# Patient Record
Sex: FEMALE | Race: WHITE | NOT HISPANIC OR LATINO | Employment: FULL TIME | URBAN - METROPOLITAN AREA
[De-identification: names, ages, dates, MRNs, and addresses within clinical notes are randomized per-mention and may not be internally consistent; named-entity substitution may affect disease eponyms.]

---

## 2017-09-12 PROBLEM — O09.523 ELDERLY MULTIGRAVIDA IN THIRD TRIMESTER: Status: ACTIVE | Noted: 2017-09-12

## 2017-10-15 PROBLEM — R51.9 HEADACHE: Status: ACTIVE | Noted: 2017-10-15

## 2017-11-03 PROBLEM — R51.9 HEADACHE: Status: RESOLVED | Noted: 2017-10-15 | Resolved: 2017-11-03

## 2017-11-03 PROBLEM — O09.523 ELDERLY MULTIGRAVIDA IN THIRD TRIMESTER: Status: RESOLVED | Noted: 2017-09-12 | Resolved: 2017-11-03

## 2020-01-21 LAB
HUMAN PAPILLOMAVIRUS (HPV): NORMAL
PAP SMEAR: NORMAL

## 2020-03-19 ENCOUNTER — TELEPHONE (OUTPATIENT)
Dept: TRANSPLANT | Facility: CLINIC | Age: 45
End: 2020-03-19

## 2020-03-19 NOTE — TELEPHONE ENCOUNTER
Rita Grant called and stated that she is interested in becoming a living donor for her friend, Garo Olivera.  Medical and social history obtained.  No contraindications noted. Patient's blood type is A+, which is not compatible with the recipient (O). Discussed the option of paired kidney donation.  All questions answered.  Kidney donor information book emailed to patient for review. Instructed to contact me with questions or if she is interested in proceeding with paired kidney donation. Patient verbalized understanding.

## 2020-04-21 DIAGNOSIS — Z01.84 ANTIBODY RESPONSE EXAMINATION: ICD-10-CM

## 2020-04-22 ENCOUNTER — LAB VISIT (OUTPATIENT)
Dept: LAB | Facility: HOSPITAL | Age: 45
End: 2020-04-22
Attending: INTERNAL MEDICINE
Payer: COMMERCIAL

## 2020-04-22 DIAGNOSIS — Z01.84 ANTIBODY RESPONSE EXAMINATION: ICD-10-CM

## 2020-04-22 LAB — SARS-COV-2 IGG SERPL QL IA: NEGATIVE

## 2020-04-22 PROCEDURE — 36415 COLL VENOUS BLD VENIPUNCTURE: CPT | Mod: PO

## 2020-04-22 PROCEDURE — 86769 SARS-COV-2 COVID-19 ANTIBODY: CPT

## 2020-05-09 DIAGNOSIS — K04.7 TOOTH ABSCESS: Primary | ICD-10-CM

## 2020-05-09 RX ORDER — PENICILLIN V POTASSIUM 500 MG/1
500 TABLET, FILM COATED ORAL EVERY 8 HOURS
Qty: 20 TABLET | Refills: 0 | Status: SHIPPED | OUTPATIENT
Start: 2020-05-09 | End: 2020-05-16

## 2020-05-25 DIAGNOSIS — B37.9 ANTIBIOTIC-INDUCED YEAST INFECTION: Primary | ICD-10-CM

## 2020-05-25 DIAGNOSIS — T36.95XA ANTIBIOTIC-INDUCED YEAST INFECTION: Primary | ICD-10-CM

## 2020-05-25 RX ORDER — FLUCONAZOLE 150 MG/1
150 TABLET ORAL ONCE
Qty: 2 TABLET | Refills: 0 | Status: SHIPPED | OUTPATIENT
Start: 2020-05-25 | End: 2020-05-25

## 2020-07-23 DIAGNOSIS — B37.9 ANTIBIOTIC-INDUCED YEAST INFECTION: ICD-10-CM

## 2020-07-23 DIAGNOSIS — N30.00 ACUTE CYSTITIS WITHOUT HEMATURIA: Primary | ICD-10-CM

## 2020-07-23 DIAGNOSIS — T36.95XA ANTIBIOTIC-INDUCED YEAST INFECTION: ICD-10-CM

## 2020-07-23 RX ORDER — FLUCONAZOLE 150 MG/1
150 TABLET ORAL ONCE
Qty: 2 TABLET | Refills: 0 | Status: SHIPPED | OUTPATIENT
Start: 2020-07-23 | End: 2020-07-23

## 2020-07-23 RX ORDER — CEPHALEXIN 500 MG/1
500 CAPSULE ORAL EVERY 12 HOURS
Qty: 10 CAPSULE | Refills: 0 | Status: SHIPPED | OUTPATIENT
Start: 2020-07-23 | End: 2020-07-28

## 2020-08-28 ENCOUNTER — OFFICE VISIT (OUTPATIENT)
Dept: FAMILY MEDICINE | Facility: CLINIC | Age: 45
End: 2020-08-28
Payer: COMMERCIAL

## 2020-08-28 VITALS
OXYGEN SATURATION: 97 % | BODY MASS INDEX: 25.57 KG/M2 | DIASTOLIC BLOOD PRESSURE: 70 MMHG | HEIGHT: 64 IN | SYSTOLIC BLOOD PRESSURE: 108 MMHG | WEIGHT: 149.81 LBS | TEMPERATURE: 98 F | HEART RATE: 68 BPM

## 2020-08-28 DIAGNOSIS — Z00.00 WELL ADULT EXAM: Primary | ICD-10-CM

## 2020-08-28 DIAGNOSIS — Z12.31 ENCOUNTER FOR SCREENING MAMMOGRAM FOR MALIGNANT NEOPLASM OF BREAST: ICD-10-CM

## 2020-08-28 DIAGNOSIS — R10.10 UPPER ABDOMINAL PAIN: ICD-10-CM

## 2020-08-28 DIAGNOSIS — Z98.84 HISTORY OF ROUX-EN-Y GASTRIC BYPASS: ICD-10-CM

## 2020-08-28 PROCEDURE — 3008F PR BODY MASS INDEX (BMI) DOCUMENTED: ICD-10-PCS | Mod: CPTII,S$GLB,, | Performed by: INTERNAL MEDICINE

## 2020-08-28 PROCEDURE — 3008F BODY MASS INDEX DOCD: CPT | Mod: CPTII,S$GLB,, | Performed by: INTERNAL MEDICINE

## 2020-08-28 PROCEDURE — 99386 PR PREVENTIVE VISIT,NEW,40-64: ICD-10-PCS | Mod: S$GLB,,, | Performed by: INTERNAL MEDICINE

## 2020-08-28 PROCEDURE — 99386 PREV VISIT NEW AGE 40-64: CPT | Mod: S$GLB,,, | Performed by: INTERNAL MEDICINE

## 2020-08-28 RX ORDER — LEVONORGESTREL AND ETHINYL ESTRADIOL 0.1-0.02MG
KIT ORAL
COMMUNITY
Start: 2020-06-26 | End: 2021-01-04

## 2020-08-28 RX ORDER — VALACYCLOVIR HYDROCHLORIDE 1 G/1
1000 TABLET, FILM COATED ORAL 2 TIMES DAILY
COMMUNITY
Start: 2020-08-21 | End: 2021-01-04 | Stop reason: DRUGHIGH

## 2020-08-28 RX ORDER — MAGNESIUM 250 MG
TABLET ORAL DAILY
COMMUNITY
Start: 2020-07-12

## 2020-08-28 RX ORDER — BIOTIN 10 MG
TABLET ORAL DAILY
COMMUNITY

## 2020-08-28 RX ORDER — MELATONIN 10 MG
CAPSULE ORAL NIGHTLY
COMMUNITY
Start: 2020-08-04

## 2020-08-28 NOTE — PROGRESS NOTES
Subjective:       Patient ID: Rita Grant is a 45 y.o. female.    Medication List with Changes/Refills   Current Medications    BALCOLTRA 0.1 MG-0.02 MG (21)/36.5 MG(7) TAB        BIOTIN 10 MG TAB        ERGOCALCIFEROL, VITAMIN D2, (VITAMIN D ORAL)        LEVONORGESTREL-ETHINYL ESTRADIOL (AVIANE,ALESSE,LESSINA) 0.1-20 MG-MCG PER TABLET        MAGNESIUM 250 MG TAB        MELATONIN 10 MG CAP        PRENATAL VIT,CALC76/IRON/FOLIC (PNV 29-1 ORAL)    Take by mouth.    VALACYCLOVIR (VALTREX) 1000 MG TABLET    Take 1,000 mg by mouth 2 (two) times daily.   Discontinued Medications    HYDROCODONE-ACETAMINOPHEN (NORCO) 7.5-325 MG PER TABLET        PANTOPRAZOLE (PROTONIX) 20 MG TABLET    Take 2 tablets (40 mg total) by mouth once daily.    SUCRALFATE (CARAFATE) 1 GRAM TABLET    Take 1 tablet (1 g total) by mouth 4 (four) times daily.       Chief Complaint: Follow-up  She is a new patient here today to establish care and f/u on recent ER visit.     She has history of bypass surgery (Rodriguez--Y) in 2001. She has not had her labs checked in some time.  She denies any complications since the bypass surgery.      She was seen in ER on 8/24/2020 for severe epigastric pain. Pain started suddenly with bloating and then pain under ribs from mid sternum to her back on the right side.  After 12 hrs of pain it resolved.  Rated 8/10.  Mild increase of symptoms after eating. She had nausea but no vomiting. No fevers. No diarrhea or constipation. No blood in her stool.  She tried TUMs which helped her pain a little.  She had CT and u/s in the ER which was reassuring. Labs showed only mildly elevated LFTs (AST 47, ALT 42) with normal CBC, creatinine, lipase.  She has not had recurrence of pain. She was given pantoprazole and carafate in ER which she has been taking.     She lives with her  and 4 children. She feels safe at home. She exercises regularly. She eats healthy. She is an RN with [employer].      Mammogram----due but she just  "stopped breast feeding and would like to get in a couple months.   Pap-----1/2020 Dr. Duncan  Tdap---yes  Influenza vaccine---annually     Review of Systems   Constitutional: Negative for appetite change, fatigue, fever and unexpected weight change.   HENT: Negative for congestion, ear pain, hearing loss, sore throat and trouble swallowing.    Eyes: Negative for pain and visual disturbance.   Respiratory: Negative for cough, chest tightness, shortness of breath and wheezing.    Cardiovascular: Negative for chest pain, palpitations and leg swelling.   Gastrointestinal: Positive for abdominal pain, constipation and diarrhea. Negative for blood in stool, nausea and vomiting.   Endocrine: Negative for polyuria.   Genitourinary: Negative for dysuria and hematuria.   Musculoskeletal: Negative for arthralgias, back pain and myalgias.   Skin: Negative for rash.   Neurological: Positive for headaches. Negative for dizziness, weakness and numbness.   Hematological: Does not bruise/bleed easily.   Psychiatric/Behavioral: Negative for dysphoric mood, sleep disturbance and suicidal ideas. The patient is not nervous/anxious.        Objective:      Vitals:    08/28/20 1142   BP: 108/70   Pulse: 68   Temp: 98.4 °F (36.9 °C)   TempSrc: Tympanic   SpO2: 97%   Weight: 67.9 kg (149 lb 12.8 oz)   Height: 5' 4" (1.626 m)     Body mass index is 25.71 kg/m².  Physical Exam    General appearance: No acute distress, cooperative  Eyes: PERRL, EOMI, conjunctiva clear  Ears: normal external ear and pinna, tm clear without drainage, canals clear  Nose: Normal mucosa without drainage  Throat: no exudates or erythema, tonsils not enlarged  Mouth: no sores or lesions, moist mucous membranes  Neck: FROM, soft, supple, no thyromegaly, no bruits  Lymph: no anterior or posterior cervical adenopathy  Heart::  Regular rate and rhythm, no murmur  Lung: Clear to ascultation bilaterally, no wheezing, no rales, no rhonchi, no distress  Abdomen: Soft, " nontender, no distention, no hepatosplenomegaly, bowel sounds normal, no guarding, no rebound, no peritoneal signs  Skin: no rashes, no lesions  Extremities: no edema, no cyanosis  Neuro: CN 2-12 intact, 5/5 muscle strength upper and lower extremity bilaterally, 2+ DTRs UE and LE bilaterally, normal gait  Peripheral pulses: 2+ pedal pulses bilaterally, good perfusion and color  Musculoskeletal: FROM, good strenth, no tenderness  Joint: normal appearance, no swelling, no warmth, no deformity in all joints    Assessment:       1. Well adult exam    2. History of Rodriguez-en-Y gastric bypass    3. Upper abdominal pain    4. Encounter for screening mammogram for malignant neoplasm of breast        Plan:       Well adult exam  Normal exam and other than her recent epigastric pain has been doing very well. She is due for labs and mammogram. Will get records for her pap and vaccines which is UTD.   -     CBC auto differential; Future; Expected date: 08/28/2020  -     Comprehensive metabolic panel; Future; Expected date: 08/28/2020  -     Lipid Panel; Future; Expected date: 08/28/2020  -     Vitamin B12; Future; Expected date: 08/28/2020  -     Vitamin D; Future; Expected date: 08/28/2020  -     Iron and TIBC; Future; Expected date: 08/28/2020  -     Hepatitis C Antibody; Future; Expected date: 08/28/2020  -     TSH; Future; Expected date: 08/28/2020  -     Ferritin; Future; Expected date: 08/28/2020    History of Rodriguez-en-Y gastric bypass  She is not on vitamin supplementation. Will check vitamin D, iron studies and vitamin B 12 levels.  Will replace if needed.   -     Vitamin B12; Future; Expected date: 08/28/2020  -     Vitamin D; Future; Expected date: 08/28/2020  -     Iron and TIBC; Future; Expected date: 08/28/2020  -     Ferritin; Future; Expected date: 08/28/2020    Upper abdominal pain  Resolved with reassuring imaging (no gallstones seen).  Okay to stop pantoprazole and carafate. She will let me know if symptoms recur  and then consider EGD.  I suspect due to heartburn or gas but she did have a mild increase in her LFTs.  Will recheck LFTs to make sure they have normalized.      Encounter for screening mammogram for malignant neoplasm of breast  -     Mammo Digital Screening Bilat; Future; Expected date: 08/28/2020    Follow up in about 1 year (around 8/28/2021) for annual exam.

## 2020-08-29 PROBLEM — Z98.84 HISTORY OF ROUX-EN-Y GASTRIC BYPASS: Status: ACTIVE | Noted: 2020-08-29

## 2020-08-30 ENCOUNTER — OFFICE VISIT (OUTPATIENT)
Dept: URGENT CARE | Facility: CLINIC | Age: 45
End: 2020-08-30
Payer: COMMERCIAL

## 2020-08-30 VITALS
OXYGEN SATURATION: 98 % | RESPIRATION RATE: 16 BRPM | HEIGHT: 64 IN | DIASTOLIC BLOOD PRESSURE: 82 MMHG | BODY MASS INDEX: 25.44 KG/M2 | HEART RATE: 69 BPM | TEMPERATURE: 98 F | WEIGHT: 149 LBS | SYSTOLIC BLOOD PRESSURE: 117 MMHG

## 2020-08-30 DIAGNOSIS — H61.23 EXCESSIVE CERUMEN IN BOTH EAR CANALS: Primary | ICD-10-CM

## 2020-08-30 PROCEDURE — 99214 OFFICE O/P EST MOD 30 MIN: CPT | Mod: 25,S$GLB,, | Performed by: PHYSICIAN ASSISTANT

## 2020-08-30 PROCEDURE — 99214 PR OFFICE/OUTPT VISIT, EST, LEVL IV, 30-39 MIN: ICD-10-PCS | Mod: 25,S$GLB,, | Performed by: PHYSICIAN ASSISTANT

## 2020-08-30 PROCEDURE — 69210 REMOVE IMPACTED EAR WAX UNI: CPT | Mod: S$GLB,,, | Performed by: PHYSICIAN ASSISTANT

## 2020-08-30 PROCEDURE — 69210 EAR CERUMEN REMOVAL: ICD-10-PCS | Mod: S$GLB,,, | Performed by: PHYSICIAN ASSISTANT

## 2020-08-30 RX ORDER — NEOMYCIN SULFATE, POLYMYXIN B SULFATE, HYDROCORTISONE 3.5; 10000; 1 MG/ML; [USP'U]/ML; MG/ML
3 SOLUTION/ DROPS AURICULAR (OTIC) 3 TIMES DAILY
Qty: 10 ML | Refills: 0 | Status: SHIPPED | OUTPATIENT
Start: 2020-08-30 | End: 2020-09-09

## 2020-08-30 NOTE — PATIENT INSTRUCTIONS
· Follow up with your primary care if symptoms do not improve, or you may return here at any time.  · If you were referred to a specialist, please follow up with that specialty.  · If you were prescribed antibiotics, please take them to completion.  · If you were prescribed a narcotic or any medication with sedative effects, do not drive or operate heavy equipment or machinery while taking these medications.  · You must understand that you have received treatment at an Urgent Care facility only, and that you may be released before all of your medical problems are known or treated. Urgent Care facilities are not equipped to handle life threatening emergencies. It is recommended that you seek care at an Emergency Department for further evaluation of worsening or concerning symptoms, or possibly life threatening conditions as discussed.                                        If you  smoke, please stop smoking               PLEASE PRACTICE SOCIAL DISTANCING          Earwax Removal    The ear canal makes earwax from the canals lining. The ears make wax to lubricate and protect the ear canal. The ear canal is the tube that connects the middle ear to the outside of the ear. The wax protects the ear from bacteria, infection, and damage from water or trauma.  The wax that forms in the canal naturally moves toward the outside of the ear and falls out. In some cases, the ear may make too much wax. If the wax causes problems or keeps the healthcare provider from seeing into the ear, the extra wax may be removed.  Too much wax can affect your hearing. It can cause itching. In rare cases, it can be painful. Earwax should not be removed unless it is causing a problem. You should not stick objects into your ear to remove wax unless told to do so by your healthcare provider.  Healthcare providers can remove earwax safely. It is important to stay still during the procedure to avoid damage to the ear canal. But removing earwax  generally doesnt hurt. You will not usually need anesthesia or pain medicine when the provider removes the earwax.  A number of conditions lead to earwax buildup. These include some skin problems, a narrow ear canal, or ears that make too much earwax. Using cotton swabs in the canal pushes earwax deeper into the ear and contributes to the buildup of earwax.  Home care  · The healthcare provider may recommend mineral oil or an over-the-counter eardrop to use at home to soften the earwax. Use these products only if the provider recommends them. Use these products only if the provider recommends them. Carefully follow the instructions given.  · Dont use mineral oil or OTC eardrops if you might have an ear infection or a ruptured eardrum. Tell your healthcare provider right away if you have diabetes or an immune disorder.  · Dont use cotton swabs in your ears. Cotton swabs may push wax deeper into the ear canal or damage the eardrum. Use cotton gauze or a wet washcloth  to gently remove wax on the outside of the ear and around the opening to the ear canal.  · Don't use any probing device or object such as cotton-tipped swabs or anuja pins to clean the inside of your ears.  · Dont use ear candles to clean your ears. Candling can be dangerous. It can burn the ear canal. It can also make the condition worse instead of better.  · Dont use cold water to rinse the ear. This will make you dizzy. If your provider tells you to rinse your ear, use only warm water or follow his or her instructions.  · Check the ear for signs of infection or irritation listed below under When to seek medical advice.  Steps for using eardrops  1. Warm the medicine bottle by rubbing it between your hands for a few minutes.  2. Lie down on your side, with the affected ear up.  3. Place the recommended number of drops in the ear. Wet a cotton ball with the medicine. Gently put the cotton ball into the ear opening.  Follow-up care  Follow up with  your healthcare provider, or as directed.  When to seek medical advice  Call the provider right away if you have:  · Ear pain that gets worse  · Fever of 100.4F°F (38°C) or higher, or as directed by your healthcare provider  · Worsening wax buildup  · Severe pain, dizziness, or nausea  · Bleeding from the ear  · Hearing problems  · Signs of irritation from the eardrops, such as burning, stinging, or swelling and tenderness  · Foul-smelling fluid draining from the ear  · Swelling, redness, or tenderness of the outer ear  · Headache, neck pain, or stiff neck  Date Last Reviewed: 3/22/2015  © 1801-4211 [employer]. [address] [place] [address], [place] [address]. All rights reserved. This information is not intended as a substitute for professional medical care. Always follow your healthcare professional's instructions.

## 2020-08-30 NOTE — PROCEDURES
Ear Cerumen Removal    Date/Time: 8/30/2020 9:25 AM  Performed by: Angel Greenberg PA-C  Authorized by: Angel Greenberg PA-C     Consent Done?:  Yes (Verbal)    Local anesthetic:  None  Ceruminolytics applied: Ceruminolytics applied prior to the procedure    Location details:  Both ears  Procedure type comment:  Irrigation and curette  Cerumen  Removal Results:  Cerumen completely removed  Patient tolerance:  Patient tolerated the procedure well with no immediate complications

## 2020-08-30 NOTE — PROGRESS NOTES
"Subjective:       Patient ID: Rita Grant is a 45 y.o. female.    Vitals:  height is 5' 4" (1.626 m) and weight is 67.6 kg (149 lb). Her oral temperature is 98 °F (36.7 °C). Her blood pressure is 117/82 and her pulse is 69. Her respiration is 16 and oxygen saturation is 98%.     Chief Complaint: Cerumen Impaction    Pt presents today with wax build up in her ears. Pt was at [facility] Friday and was told she had large amount of wax build up in her ears to use OTC remover. Pt did attempt but no relief.     Ear Fullness   There is pain in both ears. This is a new problem. The current episode started in the past 7 days. The problem occurs constantly. The problem has been unchanged. There has been no fever. The fever has been present for less than 1 day. The patient is experiencing no pain. Pertinent negatives include no abdominal pain, coughing, diarrhea, ear discharge, headaches, hearing loss, neck pain, rash, rhinorrhea, sore throat or vomiting. Treatments tried: OTC wax remover. The treatment provided no relief. There is no history of a chronic ear infection, hearing loss or a tympanostomy tube.       Constitution: Negative for chills, fatigue and fever.   HENT: Negative for ear discharge, hearing loss, congestion and sore throat.    Neck: Negative for neck pain and painful lymph nodes.   Cardiovascular: Negative for chest pain and leg swelling.   Eyes: Negative for double vision and blurred vision.   Respiratory: Negative for cough and shortness of breath.    Gastrointestinal: Negative for abdominal pain, nausea, vomiting and diarrhea.   Genitourinary: Negative for dysuria, frequency, urgency and history of kidney stones.   Musculoskeletal: Negative for joint pain, joint swelling, muscle cramps and muscle ache.   Skin: Negative for color change, pale, rash and bruising.   Allergic/Immunologic: Negative for seasonal allergies.   Neurological: Negative for dizziness, history of vertigo, light-headedness, passing out and " headaches.   Hematologic/Lymphatic: Negative for swollen lymph nodes.   Psychiatric/Behavioral: Negative for nervous/anxious, sleep disturbance and depression. The patient is not nervous/anxious.        Objective:      Physical Exam   Constitutional: She is cooperative.  Non-toxic appearance. She does not appear ill. No distress.   HENT:   Ears:   Right Ear: Hearing and tympanic membrane normal.   Left Ear: Hearing and tympanic membrane normal.   Ears:    Pulmonary/Chest: Effort normal. No respiratory distress.   Abdominal: Normal appearance.   Neurological: She is alert.   Skin: Skin is not diaphoretic.         Assessment:       1. Excessive cerumen in both ear canals        Plan:       All hx was provided by the pt or available as part of established EMR. The pt past medical hx, family hx, social hx, and current medications were reviewed. Tx options discussed. Pt to follow up with [facility] if no improvement or for any concern, and seek treatment in an ER for worsening. Pt voiced understanding of all discussed, and agreed with decision making.    Excessive cerumen in both ear canals  -     Ear Cerumen Removal    Other orders  -     neomycin-polymyxin-hydrocortisone (CORTISPORIN) otic solution; Place 3 drops into both ears 3 (three) times daily. for 10 days  Dispense: 10 mL; Refill: 0      Patient Instructions   · Follow up with your primary care if symptoms do not improve, or you may return here at any time.  · If you were referred to a specialist, please follow up with that specialty.  · If you were prescribed antibiotics, please take them to completion.  · If you were prescribed a narcotic or any medication with sedative effects, do not drive or operate heavy equipment or machinery while taking these medications.  · You must understand that you have received treatment at an Urgent Care facility only, and that you may be released before all of your medical problems are known or treated. Urgent Care facilities are not  equipped to handle life threatening emergencies. It is recommended that you seek care at an Emergency Department for further evaluation of worsening or concerning symptoms, or possibly life threatening conditions as discussed.                                        If you  smoke, please stop smoking               PLEASE PRACTICE SOCIAL DISTANCING          Earwax Removal    The ear canal makes earwax from the canals lining. The ears make wax to lubricate and protect the ear canal. The ear canal is the tube that connects the middle ear to the outside of the ear. The wax protects the ear from bacteria, infection, and damage from water or trauma.  The wax that forms in the canal naturally moves toward the outside of the ear and falls out. In some cases, the ear may make too much wax. If the wax causes problems or keeps the healthcare provider from seeing into the ear, the extra wax may be removed.  Too much wax can affect your hearing. It can cause itching. In rare cases, it can be painful. Earwax should not be removed unless it is causing a problem. You should not stick objects into your ear to remove wax unless told to do so by your healthcare provider.  Healthcare providers can remove earwax safely. It is important to stay still during the procedure to avoid damage to the ear canal. But removing earwax generally doesnt hurt. You will not usually need anesthesia or pain medicine when the provider removes the earwax.  A number of conditions lead to earwax buildup. These include some skin problems, a narrow ear canal, or ears that make too much earwax. Using cotton swabs in the canal pushes earwax deeper into the ear and contributes to the buildup of earwax.  Home care  · The healthcare provider may recommend mineral oil or an over-the-counter eardrop to use at home to soften the earwax. Use these products only if the provider recommends them. Use these products only if the provider recommends them. Carefully follow  the instructions given.  · Dont use mineral oil or OTC eardrops if you might have an ear infection or a ruptured eardrum. Tell your healthcare provider right away if you have diabetes or an immune disorder.  · Dont use cotton swabs in your ears. Cotton swabs may push wax deeper into the ear canal or damage the eardrum. Use cotton gauze or a wet washcloth  to gently remove wax on the outside of the ear and around the opening to the ear canal.  · Don't use any probing device or object such as cotton-tipped swabs or anuja pins to clean the inside of your ears.  · Dont use ear candles to clean your ears. Candling can be dangerous. It can burn the ear canal. It can also make the condition worse instead of better.  · Dont use cold water to rinse the ear. This will make you dizzy. If your provider tells you to rinse your ear, use only warm water or follow his or her instructions.  · Check the ear for signs of infection or irritation listed below under When to seek medical advice.  Steps for using eardrops  1. Warm the medicine bottle by rubbing it between your hands for a few minutes.  2. Lie down on your side, with the affected ear up.  3. Place the recommended number of drops in the ear. Wet a cotton ball with the medicine. Gently put the cotton ball into the ear opening.  Follow-up care  Follow up with your healthcare provider, or as directed.  When to seek medical advice  Call the provider right away if you have:  · Ear pain that gets worse  · Fever of 100.4F°F (38°C) or higher, or as directed by your healthcare provider  · Worsening wax buildup  · Severe pain, dizziness, or nausea  · Bleeding from the ear  · Hearing problems  · Signs of irritation from the eardrops, such as burning, stinging, or swelling and tenderness  · Foul-smelling fluid draining from the ear  · Swelling, redness, or tenderness of the outer ear  · Headache, neck pain, or stiff neck  Date Last Reviewed: 3/22/2015  © 4343-1784 [employer]. [address] [place] [address], [place] [address]. All rights reserved. This information is not intended as a substitute for professional medical care. Always follow your healthcare professional's instructions.

## 2020-09-19 ENCOUNTER — LAB VISIT (OUTPATIENT)
Dept: LAB | Facility: HOSPITAL | Age: 45
End: 2020-09-19
Attending: INTERNAL MEDICINE
Payer: COMMERCIAL

## 2020-09-19 DIAGNOSIS — Z98.84 HISTORY OF ROUX-EN-Y GASTRIC BYPASS: ICD-10-CM

## 2020-09-19 DIAGNOSIS — Z00.00 WELL ADULT EXAM: ICD-10-CM

## 2020-09-19 LAB
25(OH)D3+25(OH)D2 SERPL-MCNC: 39 NG/ML (ref 30–96)
ALBUMIN SERPL BCP-MCNC: 4.3 G/DL (ref 3.5–5.2)
ALP SERPL-CCNC: 62 U/L (ref 55–135)
ALT SERPL W/O P-5'-P-CCNC: 16 U/L (ref 10–44)
ANION GAP SERPL CALC-SCNC: 12 MMOL/L (ref 8–16)
AST SERPL-CCNC: 26 U/L (ref 10–40)
BASOPHILS # BLD AUTO: 0.04 K/UL (ref 0–0.2)
BASOPHILS NFR BLD: 0.7 % (ref 0–1.9)
BILIRUB SERPL-MCNC: 0.4 MG/DL (ref 0.1–1)
BUN SERPL-MCNC: 13 MG/DL (ref 6–20)
CALCIUM SERPL-MCNC: 9 MG/DL (ref 8.7–10.5)
CHLORIDE SERPL-SCNC: 107 MMOL/L (ref 95–110)
CHOLEST SERPL-MCNC: 205 MG/DL (ref 120–199)
CHOLEST/HDLC SERPL: 2.5 {RATIO} (ref 2–5)
CO2 SERPL-SCNC: 20 MMOL/L (ref 23–29)
CREAT SERPL-MCNC: 0.9 MG/DL (ref 0.5–1.4)
DIFFERENTIAL METHOD: ABNORMAL
EOSINOPHIL # BLD AUTO: 0.1 K/UL (ref 0–0.5)
EOSINOPHIL NFR BLD: 1.7 % (ref 0–8)
ERYTHROCYTE [DISTWIDTH] IN BLOOD BY AUTOMATED COUNT: 13.6 % (ref 11.5–14.5)
EST. GFR  (AFRICAN AMERICAN): >60 ML/MIN/1.73 M^2
EST. GFR  (NON AFRICAN AMERICAN): >60 ML/MIN/1.73 M^2
FERRITIN SERPL-MCNC: 34 NG/ML (ref 20–300)
GLUCOSE SERPL-MCNC: 87 MG/DL (ref 70–110)
HCT VFR BLD AUTO: 42.4 % (ref 37–48.5)
HDLC SERPL-MCNC: 82 MG/DL (ref 40–75)
HDLC SERPL: 40 % (ref 20–50)
HGB BLD-MCNC: 13.5 G/DL (ref 12–16)
IMM GRANULOCYTES # BLD AUTO: 0 K/UL (ref 0–0.04)
IMM GRANULOCYTES NFR BLD AUTO: 0 % (ref 0–0.5)
IRON SERPL-MCNC: 156 UG/DL (ref 30–160)
LDLC SERPL CALC-MCNC: 101.2 MG/DL (ref 63–159)
LYMPHOCYTES # BLD AUTO: 2.5 K/UL (ref 1–4.8)
LYMPHOCYTES NFR BLD: 42.7 % (ref 18–48)
MCH RBC QN AUTO: 31.5 PG (ref 27–31)
MCHC RBC AUTO-ENTMCNC: 31.8 G/DL (ref 32–36)
MCV RBC AUTO: 99 FL (ref 82–98)
MONOCYTES # BLD AUTO: 0.4 K/UL (ref 0.3–1)
MONOCYTES NFR BLD: 6.2 % (ref 4–15)
NEUTROPHILS # BLD AUTO: 2.9 K/UL (ref 1.8–7.7)
NEUTROPHILS NFR BLD: 48.7 % (ref 38–73)
NONHDLC SERPL-MCNC: 123 MG/DL
NRBC BLD-RTO: 0 /100 WBC
PLATELET # BLD AUTO: 154 K/UL (ref 150–350)
PMV BLD AUTO: 11 FL (ref 9.2–12.9)
POTASSIUM SERPL-SCNC: 4.5 MMOL/L (ref 3.5–5.1)
PROT SERPL-MCNC: 7.8 G/DL (ref 6–8.4)
RBC # BLD AUTO: 4.29 M/UL (ref 4–5.4)
SATURATED IRON: 40 % (ref 20–50)
SODIUM SERPL-SCNC: 139 MMOL/L (ref 136–145)
T4 FREE SERPL-MCNC: 1.05 NG/DL (ref 0.71–1.51)
TOTAL IRON BINDING CAPACITY: 389 UG/DL (ref 250–450)
TRANSFERRIN SERPL-MCNC: 263 MG/DL (ref 200–375)
TRIGL SERPL-MCNC: 109 MG/DL (ref 30–150)
TSH SERPL DL<=0.005 MIU/L-ACNC: 0.38 UIU/ML (ref 0.4–4)
VIT B12 SERPL-MCNC: 299 PG/ML (ref 210–950)
WBC # BLD AUTO: 5.93 K/UL (ref 3.9–12.7)

## 2020-09-19 PROCEDURE — 82728 ASSAY OF FERRITIN: CPT

## 2020-09-19 PROCEDURE — 83540 ASSAY OF IRON: CPT

## 2020-09-19 PROCEDURE — 82306 VITAMIN D 25 HYDROXY: CPT

## 2020-09-19 PROCEDURE — 85025 COMPLETE CBC W/AUTO DIFF WBC: CPT

## 2020-09-19 PROCEDURE — 86803 HEPATITIS C AB TEST: CPT

## 2020-09-19 PROCEDURE — 80053 COMPREHEN METABOLIC PANEL: CPT

## 2020-09-19 PROCEDURE — 84443 ASSAY THYROID STIM HORMONE: CPT

## 2020-09-19 PROCEDURE — 84439 ASSAY OF FREE THYROXINE: CPT

## 2020-09-19 PROCEDURE — 36415 COLL VENOUS BLD VENIPUNCTURE: CPT | Mod: PO

## 2020-09-19 PROCEDURE — 82607 VITAMIN B-12: CPT

## 2020-09-19 PROCEDURE — 80061 LIPID PANEL: CPT

## 2020-09-21 LAB — HCV AB SERPL QL IA: NEGATIVE

## 2020-09-22 ENCOUNTER — PATIENT MESSAGE (OUTPATIENT)
Dept: FAMILY MEDICINE | Facility: CLINIC | Age: 45
End: 2020-09-22

## 2020-09-22 DIAGNOSIS — R94.6 ABNORMAL RESULTS OF THYROID FUNCTION STUDIES: Primary | ICD-10-CM

## 2020-09-23 ENCOUNTER — PATIENT MESSAGE (OUTPATIENT)
Dept: FAMILY MEDICINE | Facility: CLINIC | Age: 45
End: 2020-09-23

## 2020-10-05 ENCOUNTER — PATIENT MESSAGE (OUTPATIENT)
Dept: ADMINISTRATIVE | Facility: HOSPITAL | Age: 45
End: 2020-10-05

## 2020-10-12 ENCOUNTER — PATIENT MESSAGE (OUTPATIENT)
Dept: ADMINISTRATIVE | Facility: HOSPITAL | Age: 45
End: 2020-10-12

## 2020-10-12 ENCOUNTER — PATIENT OUTREACH (OUTPATIENT)
Dept: ADMINISTRATIVE | Facility: HOSPITAL | Age: 45
End: 2020-10-12

## 2020-10-12 ENCOUNTER — PATIENT MESSAGE (OUTPATIENT)
Dept: FAMILY MEDICINE | Facility: CLINIC | Age: 45
End: 2020-10-12

## 2020-10-12 DIAGNOSIS — Z82.49 FAMILY HISTORY OF EARLY CAD: Primary | ICD-10-CM

## 2020-10-12 NOTE — PROGRESS NOTES
Non-compliant [facility] report chart review - Chart review completed for the following  test if overdue  (Mammogram, Colonoscopy, Cervical Cancer Screening,  Diabetic lab testing, and/or Dilated EYE EXAM)  10/12/2020    Care Everywhere and Media reports - updates requested and reviewed.        [facility] and [facility] reviewed.  Pap Smear and/or  LABS       [facility] reviewed for test needed.  Mammogram        Updated HM with Pap smear with HPV     report.             Health Maintenance Due   Topic Date Due    Mammogram  04/20/2016

## 2020-10-14 ENCOUNTER — PATIENT MESSAGE (OUTPATIENT)
Dept: FAMILY MEDICINE | Facility: CLINIC | Age: 45
End: 2020-10-14

## 2020-10-14 ENCOUNTER — HOSPITAL ENCOUNTER (OUTPATIENT)
Dept: RADIOLOGY | Facility: HOSPITAL | Age: 45
Discharge: HOME | End: 2020-10-14
Attending: INTERNAL MEDICINE
Payer: COMMERCIAL

## 2020-10-14 DIAGNOSIS — M25.572 ACUTE LEFT ANKLE PAIN: ICD-10-CM

## 2020-10-14 DIAGNOSIS — M25.572 ACUTE LEFT ANKLE PAIN: Primary | ICD-10-CM

## 2020-10-14 PROCEDURE — 73610 X-RAY EXAM OF ANKLE: CPT | Mod: 26,LT,, | Performed by: RADIOLOGY

## 2020-10-14 PROCEDURE — 73610 XR ANKLE COMPLETE 3 VIEW LEFT: ICD-10-PCS | Mod: 26,LT,, | Performed by: RADIOLOGY

## 2020-10-14 PROCEDURE — 73610 X-RAY EXAM OF ANKLE: CPT | Mod: TC,FY,PO,LT

## 2020-10-14 NOTE — TELEPHONE ENCOUNTER
It looks like you have a glitch in the schedule today. 2 back to back indirect patient care spots.Can we put her in @ 11?

## 2020-10-15 ENCOUNTER — TELEPHONE (OUTPATIENT)
Dept: FAMILY MEDICINE | Facility: CLINIC | Age: 45
End: 2020-10-15

## 2020-10-15 DIAGNOSIS — M25.572 ACUTE LEFT ANKLE PAIN: Primary | ICD-10-CM

## 2020-10-15 NOTE — TELEPHONE ENCOUNTER
I called last night about her xray.     Please call her to schedule appt with orthopedics.     Thanks

## 2020-10-16 ENCOUNTER — OFFICE VISIT (OUTPATIENT)
Dept: ORTHOPEDICS | Facility: CLINIC | Age: 45
End: 2020-10-16
Payer: COMMERCIAL

## 2020-10-16 ENCOUNTER — LAB VISIT (OUTPATIENT)
Dept: LAB | Facility: HOSPITAL | Age: 45
End: 2020-10-16
Attending: INTERNAL MEDICINE
Payer: COMMERCIAL

## 2020-10-16 VITALS — BODY MASS INDEX: 25.45 KG/M2 | WEIGHT: 149.06 LBS | HEIGHT: 64 IN

## 2020-10-16 DIAGNOSIS — R94.6 ABNORMAL RESULTS OF THYROID FUNCTION STUDIES: ICD-10-CM

## 2020-10-16 DIAGNOSIS — M25.572 ACUTE LEFT ANKLE PAIN: Primary | ICD-10-CM

## 2020-10-16 DIAGNOSIS — S93.402A SPRAIN OF LEFT ANKLE, UNSPECIFIED LIGAMENT, INITIAL ENCOUNTER: ICD-10-CM

## 2020-10-16 LAB — TSH SERPL DL<=0.005 MIU/L-ACNC: 2.68 UIU/ML (ref 0.4–4)

## 2020-10-16 PROCEDURE — 99203 OFFICE O/P NEW LOW 30 MIN: CPT | Mod: 25,S$GLB,, | Performed by: ORTHOPAEDIC SURGERY

## 2020-10-16 PROCEDURE — 3008F PR BODY MASS INDEX (BMI) DOCUMENTED: ICD-10-PCS | Mod: CPTII,S$GLB,, | Performed by: ORTHOPAEDIC SURGERY

## 2020-10-16 PROCEDURE — 97760 PR ORTHOTIC MGMT&TRAINJ INITIAL ENC EA 15 MINS: ICD-10-PCS | Mod: S$GLB,,, | Performed by: ORTHOPAEDIC SURGERY

## 2020-10-16 PROCEDURE — 3008F BODY MASS INDEX DOCD: CPT | Mod: CPTII,S$GLB,, | Performed by: ORTHOPAEDIC SURGERY

## 2020-10-16 PROCEDURE — 36415 COLL VENOUS BLD VENIPUNCTURE: CPT | Mod: PO

## 2020-10-16 PROCEDURE — 99999 PR PBB SHADOW E&M-EST. PATIENT-LVL III: CPT | Mod: PBBFAC,,, | Performed by: ORTHOPAEDIC SURGERY

## 2020-10-16 PROCEDURE — 99999 PR PBB SHADOW E&M-EST. PATIENT-LVL III: ICD-10-PCS | Mod: PBBFAC,,, | Performed by: ORTHOPAEDIC SURGERY

## 2020-10-16 PROCEDURE — 99203 PR OFFICE/OUTPT VISIT, NEW, LEVL III, 30-44 MIN: ICD-10-PCS | Mod: 25,S$GLB,, | Performed by: ORTHOPAEDIC SURGERY

## 2020-10-16 PROCEDURE — 84443 ASSAY THYROID STIM HORMONE: CPT

## 2020-10-16 PROCEDURE — 97760 ORTHOTIC MGMT&TRAING 1ST ENC: CPT | Mod: S$GLB,,, | Performed by: ORTHOPAEDIC SURGERY

## 2020-10-16 NOTE — PROGRESS NOTES
45 years old complaining of pain in the left ankle for about 3 weeks time.  Nose after running for exercise.  Pain is doing days, 6 on bad days.  Ice and ibuprofen seem to help worse walking seems to make it worse.  Points to the anterior lateral aspect of the ankle as location pain    Exam shows he is tender in the region of the ATFL, I cannot detect any instability, flexors and extensors are intact, nontender along the peroneal tendons or the posterior tibial tendon, negative Tinel's at the tarsal tunnel    X-rays are negative    Assessment:  Left ankle sprain    Plan:  Lace-up ankle brace, ankle strengthening exercise, follow-up in a few weeks time to ensure complete resolution of her symptoms    We performed a custom orthotic/brace fitting, adjusting and training with the patient. The patient demonstrated understanding and proper care. This was performed for 15 minutes.    Further History  Aching pain  Worse with activity  Relieved with rest  No other associated symptoms  No other radiation    Further Exam  Alert and oriented  Pleasant  Contralateral limb has appropriate range of motion for age and condition  Contralateral limb has appropriate strength for age and condition  Contralateral limb has appropriate stability  for age and condition  No adenopathy  Pulses are appropriate for current condition  Skin is intact        Chief Complaint    Chief Complaint   Patient presents with    Left Ankle - Pain       HPI  Rita Grant is a 45 y.o.  female who presents with       Past Medical History  Past Medical History:   Diagnosis Date    Anemia        Past Surgical History  Past Surgical History:   Procedure Laterality Date     SECTION      x 3    GASTRIC BYPASS      Rodriguez n Y       Medications  Current Outpatient Medications   Medication Sig    biotin 10 mg Tab     ergocalciferol, vitamin D2, (VITAMIN D ORAL)     levonorgestrel-ethinyl estradiol (AVIANE,ALESSE,LESSINA) 0.1-20 mg-mcg per tablet      magnesium 250 mg Tab     melatonin 10 mg Cap     PRENATAL VIT,CALC76/IRON/FOLIC (PNV 29-1 ORAL) Take by mouth.    valACYclovir (VALTREX) 1000 MG tablet Take 1,000 mg by mouth 2 (two) times daily.    BALCOLTRA 0.1 mg-0.02 mg (21)/36.5 mg(7) Tab      No current facility-administered medications for this visit.        Allergies  Review of patient's allergies indicates:   Allergen Reactions    Bactrim [sulfamethoxazole-trimethoprim] Swelling       Family History  Family History   Problem Relation Age of Onset    Lung cancer Paternal Grandfather         lung    Colon cancer Paternal Grandmother         colon    Heart disease Maternal Grandfather     Hypertension Father     Heart disease Father     Thyroid nodules Mother     Hypothyroidism Mother     Ovarian cancer Maternal Grandmother        Social History  Social History     Socioeconomic History    Marital status:      Spouse name: Not on file    Number of children: Not on file    Years of education: Not on file    Highest education level: Not on file   Occupational History    Occupation: nurse    Social Needs    Financial resource strain: Not on file    Food insecurity     Worry: Not on file     Inability: Not on file    Transportation needs     Medical: Not on file     Non-medical: Not on file   Tobacco Use    Smoking status: Never Smoker    Smokeless tobacco: Never Used   Substance and Sexual Activity    Alcohol use: Yes     Frequency: Monthly or less     Comment: 2x year    Drug use: No    Sexual activity: Yes     Partners: Male   Lifestyle    Physical activity     Days per week: Not on file     Minutes per session: Not on file    Stress: Not on file   Relationships    Social connections     Talks on phone: Not on file     Gets together: Not on file     Attends Jehovah's witness service: Not on file     Active member of club or organization: Not on file     Attends meetings of clubs or organizations: Not on file     Relationship  status: Not on file   Other Topics Concern    Not on file   Social History Narrative    Not on file               Review of Systems     Constitutional: Negative    HENT: Negative  Eyes: Negative  Respiratory: Negative  Cardiovascular: Negative  Musculoskeletal: HPI  Skin: Negative  Neurological: Negative  Hematological: Negative  Endocrine: Negative                 Physical Exam    There were no vitals filed for this visit.  Body mass index is 25.58 kg/m².  Physical Examination:     General appearance -  well appearing, and in no distress  Mental status - awake  Neck - supple  Chest -  symmetric air entry  Heart - normal rate   Abdomen - soft      Assessment     1. Acute left ankle pain          Plan

## 2020-10-17 ENCOUNTER — PATIENT MESSAGE (OUTPATIENT)
Dept: FAMILY MEDICINE | Facility: CLINIC | Age: 45
End: 2020-10-17

## 2020-11-04 ENCOUNTER — PATIENT MESSAGE (OUTPATIENT)
Dept: FAMILY MEDICINE | Facility: CLINIC | Age: 45
End: 2020-11-04

## 2020-11-14 ENCOUNTER — HOSPITAL ENCOUNTER (OUTPATIENT)
Dept: RADIOLOGY | Facility: HOSPITAL | Age: 45
Discharge: HOME | End: 2020-11-14
Attending: INTERNAL MEDICINE
Payer: COMMERCIAL

## 2020-11-14 DIAGNOSIS — Z12.31 SCREENING MAMMOGRAM, ENCOUNTER FOR: ICD-10-CM

## 2020-11-14 PROCEDURE — 77067 SCR MAMMO BI INCL CAD: CPT | Mod: 26,,, | Performed by: RADIOLOGY

## 2020-11-14 PROCEDURE — 77063 MAMMO DIGITAL SCREENING BILAT WITH TOMO: ICD-10-PCS | Mod: 26,,, | Performed by: RADIOLOGY

## 2020-11-14 PROCEDURE — 77067 SCR MAMMO BI INCL CAD: CPT | Mod: TC,PO

## 2020-11-14 PROCEDURE — 77067 MAMMO DIGITAL SCREENING BILAT WITH TOMO: ICD-10-PCS | Mod: 26,,, | Performed by: RADIOLOGY

## 2020-11-14 PROCEDURE — 77063 BREAST TOMOSYNTHESIS BI: CPT | Mod: 26,,, | Performed by: RADIOLOGY

## 2020-11-22 DIAGNOSIS — B37.9 ANTIBIOTIC-INDUCED YEAST INFECTION: Primary | ICD-10-CM

## 2020-11-22 DIAGNOSIS — T36.95XA ANTIBIOTIC-INDUCED YEAST INFECTION: Primary | ICD-10-CM

## 2020-11-22 RX ORDER — FLUCONAZOLE 150 MG/1
150 TABLET ORAL ONCE
Qty: 2 TABLET | Refills: 0 | Status: SHIPPED | OUTPATIENT
Start: 2020-11-22 | End: 2020-11-22

## 2021-01-04 ENCOUNTER — OFFICE VISIT (OUTPATIENT)
Dept: FAMILY MEDICINE | Facility: CLINIC | Age: 46
End: 2021-01-04
Payer: COMMERCIAL

## 2021-01-04 VITALS
BODY MASS INDEX: 27.1 KG/M2 | HEIGHT: 64 IN | RESPIRATION RATE: 16 BRPM | OXYGEN SATURATION: 99 % | WEIGHT: 158.75 LBS | DIASTOLIC BLOOD PRESSURE: 82 MMHG | TEMPERATURE: 98 F | SYSTOLIC BLOOD PRESSURE: 112 MMHG | HEART RATE: 77 BPM

## 2021-01-04 DIAGNOSIS — R10.13 DYSPEPSIA: ICD-10-CM

## 2021-01-04 DIAGNOSIS — R07.81 ANTERIOR PLEURITIC PAIN: Primary | ICD-10-CM

## 2021-01-04 PROCEDURE — 3008F BODY MASS INDEX DOCD: CPT | Mod: CPTII,S$GLB,, | Performed by: INTERNAL MEDICINE

## 2021-01-04 PROCEDURE — 1125F PR PAIN SEVERITY QUANTIFIED, PAIN PRESENT: ICD-10-PCS | Mod: S$GLB,,, | Performed by: INTERNAL MEDICINE

## 2021-01-04 PROCEDURE — 99213 PR OFFICE/OUTPT VISIT, EST, LEVL III, 20-29 MIN: ICD-10-PCS | Mod: S$GLB,,, | Performed by: INTERNAL MEDICINE

## 2021-01-04 PROCEDURE — 3008F PR BODY MASS INDEX (BMI) DOCUMENTED: ICD-10-PCS | Mod: CPTII,S$GLB,, | Performed by: INTERNAL MEDICINE

## 2021-01-04 PROCEDURE — 1125F AMNT PAIN NOTED PAIN PRSNT: CPT | Mod: S$GLB,,, | Performed by: INTERNAL MEDICINE

## 2021-01-04 PROCEDURE — 99213 OFFICE O/P EST LOW 20 MIN: CPT | Mod: S$GLB,,, | Performed by: INTERNAL MEDICINE

## 2021-01-04 RX ORDER — VALACYCLOVIR HYDROCHLORIDE 1 G/1
1000 TABLET, FILM COATED ORAL 2 TIMES DAILY PRN
COMMUNITY

## 2021-01-22 ENCOUNTER — PATIENT MESSAGE (OUTPATIENT)
Dept: ADMINISTRATIVE | Facility: OTHER | Age: 46
End: 2021-01-22

## 2021-04-29 ENCOUNTER — PATIENT MESSAGE (OUTPATIENT)
Dept: RESEARCH | Facility: HOSPITAL | Age: 46
End: 2021-04-29

## 2021-05-05 ENCOUNTER — OFFICE VISIT (OUTPATIENT)
Dept: OPHTHALMOLOGY | Facility: CLINIC | Age: 46
End: 2021-05-05
Payer: COMMERCIAL

## 2021-05-05 DIAGNOSIS — H04.123 DRY EYES, BILATERAL: Primary | ICD-10-CM

## 2021-05-05 DIAGNOSIS — H52.7 REFRACTIVE ERROR: ICD-10-CM

## 2021-05-05 PROCEDURE — 92015 DETERMINE REFRACTIVE STATE: CPT | Mod: S$GLB,,, | Performed by: OPHTHALMOLOGY

## 2021-05-05 PROCEDURE — 1126F PR PAIN SEVERITY QUANTIFIED, NO PAIN PRESENT: ICD-10-PCS | Mod: S$GLB,,, | Performed by: OPHTHALMOLOGY

## 2021-05-05 PROCEDURE — 92004 PR EYE EXAM, NEW PATIENT,COMPREHESV: ICD-10-PCS | Mod: S$GLB,,, | Performed by: OPHTHALMOLOGY

## 2021-05-05 PROCEDURE — 92015 PR REFRACTION: ICD-10-PCS | Mod: S$GLB,,, | Performed by: OPHTHALMOLOGY

## 2021-05-05 PROCEDURE — 99999 PR PBB SHADOW E&M-EST. PATIENT-LVL III: ICD-10-PCS | Mod: PBBFAC,,, | Performed by: OPHTHALMOLOGY

## 2021-05-05 PROCEDURE — 99999 PR PBB SHADOW E&M-EST. PATIENT-LVL III: CPT | Mod: PBBFAC,,, | Performed by: OPHTHALMOLOGY

## 2021-05-05 PROCEDURE — 92004 COMPRE OPH EXAM NEW PT 1/>: CPT | Mod: S$GLB,,, | Performed by: OPHTHALMOLOGY

## 2021-05-05 PROCEDURE — 1126F AMNT PAIN NOTED NONE PRSNT: CPT | Mod: S$GLB,,, | Performed by: OPHTHALMOLOGY

## 2021-05-05 RX ORDER — MEDROXYPROGESTERONE ACETATE 10 MG/1
TABLET ORAL
COMMUNITY
Start: 2021-02-24 | End: 2021-07-29

## 2021-05-13 ENCOUNTER — IMMUNIZATION (OUTPATIENT)
Dept: FAMILY MEDICINE | Facility: CLINIC | Age: 46
End: 2021-05-13
Payer: COMMERCIAL

## 2021-05-13 DIAGNOSIS — Z23 NEED FOR VACCINATION: Primary | ICD-10-CM

## 2021-05-13 PROCEDURE — 0031A COVID-19,VECTOR-NR,RS-AD26,PF,0.5 ML DOSE VACCINE (JANSSEN): CPT | Mod: PBBFAC | Performed by: FAMILY MEDICINE

## 2021-05-27 ENCOUNTER — OFFICE VISIT (OUTPATIENT)
Dept: OPTOMETRY | Facility: CLINIC | Age: 46
End: 2021-05-27
Payer: COMMERCIAL

## 2021-05-27 DIAGNOSIS — Z46.0 CONTACT LENS/GLASSES FITTING: Primary | ICD-10-CM

## 2021-05-27 PROBLEM — Z97.3 WEARS CONTACT LENSES: Status: ACTIVE | Noted: 2021-05-27

## 2021-05-27 PROCEDURE — 1126F AMNT PAIN NOTED NONE PRSNT: CPT | Mod: S$GLB,,, | Performed by: OPTOMETRIST

## 2021-05-27 PROCEDURE — 92310 PR CONTACT LENS FITTING (NO CHANGE): ICD-10-PCS | Mod: S$GLB,,, | Performed by: OPTOMETRIST

## 2021-05-27 PROCEDURE — 99499 UNLISTED E&M SERVICE: CPT | Mod: S$GLB,,, | Performed by: OPTOMETRIST

## 2021-05-27 PROCEDURE — 99499 NO LOS: ICD-10-PCS | Mod: S$GLB,,, | Performed by: OPTOMETRIST

## 2021-05-27 PROCEDURE — 1126F PR PAIN SEVERITY QUANTIFIED, NO PAIN PRESENT: ICD-10-PCS | Mod: S$GLB,,, | Performed by: OPTOMETRIST

## 2021-05-27 PROCEDURE — 92310 CONTACT LENS FITTING OU: CPT | Mod: S$GLB,,, | Performed by: OPTOMETRIST

## 2021-06-01 ENCOUNTER — PATIENT MESSAGE (OUTPATIENT)
Dept: OPTOMETRY | Facility: CLINIC | Age: 46
End: 2021-06-01

## 2021-06-02 ENCOUNTER — PATIENT MESSAGE (OUTPATIENT)
Dept: OPTOMETRY | Facility: CLINIC | Age: 46
End: 2021-06-02

## 2021-07-29 ENCOUNTER — LAB VISIT (OUTPATIENT)
Dept: LAB | Facility: HOSPITAL | Age: 46
End: 2021-07-29
Attending: INTERNAL MEDICINE
Payer: COMMERCIAL

## 2021-07-29 ENCOUNTER — OFFICE VISIT (OUTPATIENT)
Dept: FAMILY MEDICINE | Facility: CLINIC | Age: 46
End: 2021-07-29
Payer: COMMERCIAL

## 2021-07-29 VITALS
SYSTOLIC BLOOD PRESSURE: 112 MMHG | BODY MASS INDEX: 27.55 KG/M2 | HEIGHT: 64 IN | HEART RATE: 86 BPM | OXYGEN SATURATION: 98 % | WEIGHT: 161.38 LBS | DIASTOLIC BLOOD PRESSURE: 72 MMHG

## 2021-07-29 DIAGNOSIS — Z98.84 HISTORY OF ROUX-EN-Y GASTRIC BYPASS: ICD-10-CM

## 2021-07-29 DIAGNOSIS — N61.0 CELLULITIS OF LEFT BREAST: Primary | ICD-10-CM

## 2021-07-29 LAB
25(OH)D3+25(OH)D2 SERPL-MCNC: 43 NG/ML (ref 30–96)
BASOPHILS # BLD AUTO: 0.04 K/UL (ref 0–0.2)
BASOPHILS NFR BLD: 0.6 % (ref 0–1.9)
DIFFERENTIAL METHOD: ABNORMAL
EOSINOPHIL # BLD AUTO: 0.2 K/UL (ref 0–0.5)
EOSINOPHIL NFR BLD: 2.8 % (ref 0–8)
ERYTHROCYTE [DISTWIDTH] IN BLOOD BY AUTOMATED COUNT: 13.9 % (ref 11.5–14.5)
HCT VFR BLD AUTO: 41 % (ref 37–48.5)
HGB BLD-MCNC: 13.6 G/DL (ref 12–16)
IMM GRANULOCYTES # BLD AUTO: 0.01 K/UL (ref 0–0.04)
IMM GRANULOCYTES NFR BLD AUTO: 0.1 % (ref 0–0.5)
LYMPHOCYTES # BLD AUTO: 2.4 K/UL (ref 1–4.8)
LYMPHOCYTES NFR BLD: 34.1 % (ref 18–48)
MCH RBC QN AUTO: 31.4 PG (ref 27–31)
MCHC RBC AUTO-ENTMCNC: 33.2 G/DL (ref 32–36)
MCV RBC AUTO: 95 FL (ref 82–98)
MONOCYTES # BLD AUTO: 0.5 K/UL (ref 0.3–1)
MONOCYTES NFR BLD: 6.7 % (ref 4–15)
NEUTROPHILS # BLD AUTO: 4 K/UL (ref 1.8–7.7)
NEUTROPHILS NFR BLD: 55.7 % (ref 38–73)
NRBC BLD-RTO: 0 /100 WBC
PLATELET # BLD AUTO: 219 K/UL (ref 150–450)
PMV BLD AUTO: 10.9 FL (ref 9.2–12.9)
RBC # BLD AUTO: 4.33 M/UL (ref 4–5.4)
WBC # BLD AUTO: 7.15 K/UL (ref 3.9–12.7)

## 2021-07-29 PROCEDURE — 80061 LIPID PANEL: CPT | Performed by: INTERNAL MEDICINE

## 2021-07-29 PROCEDURE — 3008F BODY MASS INDEX DOCD: CPT | Mod: CPTII,S$GLB,, | Performed by: INTERNAL MEDICINE

## 2021-07-29 PROCEDURE — 3074F SYST BP LT 130 MM HG: CPT | Mod: CPTII,S$GLB,, | Performed by: INTERNAL MEDICINE

## 2021-07-29 PROCEDURE — 36415 COLL VENOUS BLD VENIPUNCTURE: CPT | Mod: PO | Performed by: INTERNAL MEDICINE

## 2021-07-29 PROCEDURE — 80053 COMPREHEN METABOLIC PANEL: CPT | Performed by: INTERNAL MEDICINE

## 2021-07-29 PROCEDURE — 82607 VITAMIN B-12: CPT | Performed by: INTERNAL MEDICINE

## 2021-07-29 PROCEDURE — 99999 PR PBB SHADOW E&M-EST. PATIENT-LVL III: CPT | Mod: PBBFAC,,, | Performed by: INTERNAL MEDICINE

## 2021-07-29 PROCEDURE — 1160F PR REVIEW ALL MEDS BY PRESCRIBER/CLIN PHARMACIST DOCUMENTED: ICD-10-PCS | Mod: CPTII,S$GLB,, | Performed by: INTERNAL MEDICINE

## 2021-07-29 PROCEDURE — 3074F PR MOST RECENT SYSTOLIC BLOOD PRESSURE < 130 MM HG: ICD-10-PCS | Mod: CPTII,S$GLB,, | Performed by: INTERNAL MEDICINE

## 2021-07-29 PROCEDURE — 1159F MED LIST DOCD IN RCRD: CPT | Mod: CPTII,S$GLB,, | Performed by: INTERNAL MEDICINE

## 2021-07-29 PROCEDURE — 3078F PR MOST RECENT DIASTOLIC BLOOD PRESSURE < 80 MM HG: ICD-10-PCS | Mod: CPTII,S$GLB,, | Performed by: INTERNAL MEDICINE

## 2021-07-29 PROCEDURE — 1159F PR MEDICATION LIST DOCUMENTED IN MEDICAL RECORD: ICD-10-PCS | Mod: CPTII,S$GLB,, | Performed by: INTERNAL MEDICINE

## 2021-07-29 PROCEDURE — 3078F DIAST BP <80 MM HG: CPT | Mod: CPTII,S$GLB,, | Performed by: INTERNAL MEDICINE

## 2021-07-29 PROCEDURE — 1160F RVW MEDS BY RX/DR IN RCRD: CPT | Mod: CPTII,S$GLB,, | Performed by: INTERNAL MEDICINE

## 2021-07-29 PROCEDURE — 99214 PR OFFICE/OUTPT VISIT, EST, LEVL IV, 30-39 MIN: ICD-10-PCS | Mod: S$GLB,,, | Performed by: INTERNAL MEDICINE

## 2021-07-29 PROCEDURE — 83540 ASSAY OF IRON: CPT | Performed by: INTERNAL MEDICINE

## 2021-07-29 PROCEDURE — 99999 PR PBB SHADOW E&M-EST. PATIENT-LVL III: ICD-10-PCS | Mod: PBBFAC,,, | Performed by: INTERNAL MEDICINE

## 2021-07-29 PROCEDURE — 99214 OFFICE O/P EST MOD 30 MIN: CPT | Mod: S$GLB,,, | Performed by: INTERNAL MEDICINE

## 2021-07-29 PROCEDURE — 82306 VITAMIN D 25 HYDROXY: CPT | Performed by: INTERNAL MEDICINE

## 2021-07-29 PROCEDURE — 3008F PR BODY MASS INDEX (BMI) DOCUMENTED: ICD-10-PCS | Mod: CPTII,S$GLB,, | Performed by: INTERNAL MEDICINE

## 2021-07-29 PROCEDURE — 85025 COMPLETE CBC W/AUTO DIFF WBC: CPT | Performed by: INTERNAL MEDICINE

## 2021-07-29 RX ORDER — TRANEXAMIC ACID 650 MG/1
TABLET ORAL
COMMUNITY
Start: 2021-06-08 | End: 2021-12-02 | Stop reason: SDUPTHER

## 2021-07-29 RX ORDER — CEPHALEXIN 500 MG/1
500 CAPSULE ORAL EVERY 8 HOURS
Qty: 21 CAPSULE | Refills: 0 | Status: SHIPPED | OUTPATIENT
Start: 2021-07-29 | End: 2021-07-30 | Stop reason: SINTOL

## 2021-07-30 ENCOUNTER — TELEPHONE (OUTPATIENT)
Dept: FAMILY MEDICINE | Facility: CLINIC | Age: 46
End: 2021-07-30

## 2021-07-30 DIAGNOSIS — N61.0 CELLULITIS OF LEFT BREAST: Primary | ICD-10-CM

## 2021-07-30 LAB
ALBUMIN SERPL BCP-MCNC: 4.3 G/DL (ref 3.5–5.2)
ALP SERPL-CCNC: 60 U/L (ref 55–135)
ALT SERPL W/O P-5'-P-CCNC: 17 U/L (ref 10–44)
ANION GAP SERPL CALC-SCNC: 11 MMOL/L (ref 8–16)
AST SERPL-CCNC: 21 U/L (ref 10–40)
BILIRUB SERPL-MCNC: 0.3 MG/DL (ref 0.1–1)
BUN SERPL-MCNC: 13 MG/DL (ref 6–20)
CALCIUM SERPL-MCNC: 9.9 MG/DL (ref 8.7–10.5)
CHLORIDE SERPL-SCNC: 106 MMOL/L (ref 95–110)
CHOLEST SERPL-MCNC: 295 MG/DL (ref 120–199)
CHOLEST/HDLC SERPL: 3.4 {RATIO} (ref 2–5)
CO2 SERPL-SCNC: 24 MMOL/L (ref 23–29)
CREAT SERPL-MCNC: 0.9 MG/DL (ref 0.5–1.4)
EST. GFR  (AFRICAN AMERICAN): >60 ML/MIN/1.73 M^2
EST. GFR  (NON AFRICAN AMERICAN): >60 ML/MIN/1.73 M^2
GLUCOSE SERPL-MCNC: 92 MG/DL (ref 70–110)
HDLC SERPL-MCNC: 88 MG/DL (ref 40–75)
HDLC SERPL: 29.8 % (ref 20–50)
IRON SERPL-MCNC: 61 UG/DL (ref 30–160)
LDLC SERPL CALC-MCNC: 187.6 MG/DL (ref 63–159)
NONHDLC SERPL-MCNC: 207 MG/DL
POTASSIUM SERPL-SCNC: 4.1 MMOL/L (ref 3.5–5.1)
PROT SERPL-MCNC: 7.9 G/DL (ref 6–8.4)
SATURATED IRON: 17 % (ref 20–50)
SODIUM SERPL-SCNC: 141 MMOL/L (ref 136–145)
TOTAL IRON BINDING CAPACITY: 358 UG/DL (ref 250–450)
TRANSFERRIN SERPL-MCNC: 242 MG/DL (ref 200–375)
TRIGL SERPL-MCNC: 97 MG/DL (ref 30–150)
VIT B12 SERPL-MCNC: 397 PG/ML (ref 210–950)

## 2021-07-30 RX ORDER — DOXYCYCLINE 100 MG/1
100 CAPSULE ORAL 2 TIMES DAILY
Qty: 14 CAPSULE | Refills: 0 | Status: SHIPPED | OUTPATIENT
Start: 2021-07-30 | End: 2021-08-06

## 2021-08-02 ENCOUNTER — PATIENT MESSAGE (OUTPATIENT)
Dept: FAMILY MEDICINE | Facility: CLINIC | Age: 46
End: 2021-08-02

## 2021-08-02 DIAGNOSIS — E78.00 ELEVATED LDL CHOLESTEROL LEVEL: Primary | ICD-10-CM

## 2021-08-09 ENCOUNTER — PATIENT MESSAGE (OUTPATIENT)
Dept: FAMILY MEDICINE | Facility: CLINIC | Age: 46
End: 2021-08-09

## 2021-08-09 ENCOUNTER — TELEPHONE (OUTPATIENT)
Dept: RADIOLOGY | Facility: HOSPITAL | Age: 46
End: 2021-08-09

## 2021-08-09 DIAGNOSIS — N63.20 BREAST MASS, LEFT: Primary | ICD-10-CM

## 2021-08-09 DIAGNOSIS — N63.20 LEFT BREAST LUMP: Primary | ICD-10-CM

## 2021-08-10 ENCOUNTER — PATIENT MESSAGE (OUTPATIENT)
Dept: FAMILY MEDICINE | Facility: CLINIC | Age: 46
End: 2021-08-10

## 2021-08-11 ENCOUNTER — HOSPITAL ENCOUNTER (OUTPATIENT)
Dept: RADIOLOGY | Facility: HOSPITAL | Age: 46
Discharge: HOME | End: 2021-08-11
Attending: INTERNAL MEDICINE
Payer: COMMERCIAL

## 2021-08-11 DIAGNOSIS — N63.20 LEFT BREAST LUMP: ICD-10-CM

## 2021-08-11 DIAGNOSIS — N63.20 BREAST MASS, LEFT: ICD-10-CM

## 2021-08-11 PROCEDURE — 77065 DX MAMMO INCL CAD UNI: CPT | Mod: 26,LT,, | Performed by: RADIOLOGY

## 2021-08-11 PROCEDURE — 77061 BREAST TOMOSYNTHESIS UNI: CPT | Mod: TC,PO,LT

## 2021-08-11 PROCEDURE — 76642 US BREAST LEFT LIMITED: ICD-10-PCS | Mod: 26,LT,, | Performed by: RADIOLOGY

## 2021-08-11 PROCEDURE — G0279 TOMOSYNTHESIS, MAMMO: HCPCS | Mod: 26,LT,, | Performed by: RADIOLOGY

## 2021-08-11 PROCEDURE — 76642 ULTRASOUND BREAST LIMITED: CPT | Mod: TC,PO,LT

## 2021-08-11 PROCEDURE — 77065 MAMMO DIGITAL DIAGNOSTIC LEFT WITH TOMO: ICD-10-PCS | Mod: 26,LT,, | Performed by: RADIOLOGY

## 2021-08-11 PROCEDURE — G0279 MAMMO DIGITAL DIAGNOSTIC LEFT WITH TOMO: ICD-10-PCS | Mod: 26,LT,, | Performed by: RADIOLOGY

## 2021-08-11 PROCEDURE — 76642 ULTRASOUND BREAST LIMITED: CPT | Mod: 26,LT,, | Performed by: RADIOLOGY

## 2021-12-02 ENCOUNTER — OFFICE VISIT (OUTPATIENT)
Dept: FAMILY MEDICINE | Facility: CLINIC | Age: 46
End: 2021-12-02
Payer: COMMERCIAL

## 2021-12-02 VITALS
RESPIRATION RATE: 20 BRPM | WEIGHT: 166.75 LBS | DIASTOLIC BLOOD PRESSURE: 76 MMHG | TEMPERATURE: 98 F | BODY MASS INDEX: 28.47 KG/M2 | SYSTOLIC BLOOD PRESSURE: 115 MMHG | HEART RATE: 94 BPM | HEIGHT: 64 IN | OXYGEN SATURATION: 98 %

## 2021-12-02 DIAGNOSIS — Z98.84 HISTORY OF ROUX-EN-Y GASTRIC BYPASS: ICD-10-CM

## 2021-12-02 DIAGNOSIS — E53.8 VITAMIN B 12 DEFICIENCY: ICD-10-CM

## 2021-12-02 DIAGNOSIS — E78.5 HYPERLIPIDEMIA, UNSPECIFIED HYPERLIPIDEMIA TYPE: ICD-10-CM

## 2021-12-02 DIAGNOSIS — F41.8 SITUATIONAL ANXIETY: ICD-10-CM

## 2021-12-02 DIAGNOSIS — E55.9 VITAMIN D DEFICIENCY: ICD-10-CM

## 2021-12-02 DIAGNOSIS — Z00.00 WELL ADULT EXAM: Primary | ICD-10-CM

## 2021-12-02 PROCEDURE — 99396 PREV VISIT EST AGE 40-64: CPT | Mod: S$GLB,,, | Performed by: INTERNAL MEDICINE

## 2021-12-02 PROCEDURE — 99396 PR PREVENTIVE VISIT,EST,40-64: ICD-10-PCS | Mod: S$GLB,,, | Performed by: INTERNAL MEDICINE

## 2021-12-02 RX ORDER — PRASTERONE 6.5 MG/1
INSERT VAGINAL
COMMUNITY
Start: 2021-10-01

## 2021-12-02 RX ORDER — DIPHENHYDRAMINE HCL 50 MG
50 CAPSULE ORAL EVERY 6 HOURS PRN
COMMUNITY
End: 2023-02-20

## 2021-12-12 ENCOUNTER — PATIENT MESSAGE (OUTPATIENT)
Dept: FAMILY MEDICINE | Facility: CLINIC | Age: 46
End: 2021-12-12
Payer: COMMERCIAL

## 2021-12-13 ENCOUNTER — PATIENT MESSAGE (OUTPATIENT)
Dept: FAMILY MEDICINE | Facility: CLINIC | Age: 46
End: 2021-12-13
Payer: COMMERCIAL

## 2021-12-13 DIAGNOSIS — H10.023 OTHER MUCOPURULENT CONJUNCTIVITIS OF BOTH EYES: Primary | ICD-10-CM

## 2021-12-13 RX ORDER — OFLOXACIN 3 MG/ML
1 SOLUTION/ DROPS OPHTHALMIC EVERY 4 HOURS
Qty: 10 ML | Refills: 0 | Status: SHIPPED | OUTPATIENT
Start: 2021-12-13 | End: 2022-07-22 | Stop reason: ALTCHOICE

## 2021-12-13 RX ORDER — OFLOXACIN 3 MG/ML
2 SOLUTION/ DROPS OPHTHALMIC 4 TIMES DAILY
Qty: 10 ML | Refills: 0 | Status: SHIPPED | OUTPATIENT
Start: 2021-12-13 | End: 2022-07-22 | Stop reason: ALTCHOICE

## 2022-01-18 ENCOUNTER — DOCUMENTATION ONLY (OUTPATIENT)
Dept: FAMILY MEDICINE | Facility: CLINIC | Age: 47
End: 2022-01-18

## 2022-01-18 LAB
CTP QC/QA: YES
SARS-COV-2 AG RESP QL IA.RAPID: NEGATIVE

## 2022-01-20 ENCOUNTER — DOCUMENTATION ONLY (OUTPATIENT)
Dept: FAMILY MEDICINE | Facility: CLINIC | Age: 47
End: 2022-01-20

## 2022-01-20 LAB
CTP QC/QA: YES
SARS-COV-2 RDRP RESP QL NAA+PROBE: NEGATIVE

## 2022-01-24 ENCOUNTER — PATIENT MESSAGE (OUTPATIENT)
Dept: FAMILY MEDICINE | Facility: CLINIC | Age: 47
End: 2022-01-24
Payer: COMMERCIAL

## 2022-03-18 ENCOUNTER — PATIENT MESSAGE (OUTPATIENT)
Dept: ADMINISTRATIVE | Facility: HOSPITAL | Age: 47
End: 2022-03-18
Payer: COMMERCIAL

## 2022-03-18 DIAGNOSIS — Z12.11 SCREENING FOR COLON CANCER: ICD-10-CM

## 2022-04-05 LAB — HEMOCCULT STL QL IA: NEGATIVE

## 2022-05-13 ENCOUNTER — PATIENT MESSAGE (OUTPATIENT)
Dept: FAMILY MEDICINE | Facility: CLINIC | Age: 47
End: 2022-05-13
Payer: COMMERCIAL

## 2022-06-07 ENCOUNTER — OFFICE VISIT (OUTPATIENT)
Dept: OPTOMETRY | Facility: CLINIC | Age: 47
End: 2022-06-07
Payer: COMMERCIAL

## 2022-06-07 DIAGNOSIS — H52.13 MYOPIA WITH ASTIGMATISM AND PRESBYOPIA, BILATERAL: ICD-10-CM

## 2022-06-07 DIAGNOSIS — H52.4 MYOPIA WITH ASTIGMATISM AND PRESBYOPIA, BILATERAL: ICD-10-CM

## 2022-06-07 DIAGNOSIS — H52.203 MYOPIA WITH ASTIGMATISM AND PRESBYOPIA, BILATERAL: ICD-10-CM

## 2022-06-07 DIAGNOSIS — H43.393 VITREOUS FLOATERS, BILATERAL: ICD-10-CM

## 2022-06-07 DIAGNOSIS — Z46.0 CONTACT LENS/GLASSES FITTING: ICD-10-CM

## 2022-06-07 DIAGNOSIS — Z46.0 CONTACT LENS/GLASSES FITTING: Primary | ICD-10-CM

## 2022-06-07 DIAGNOSIS — Z01.00 EXAMINATION OF EYES AND VISION: Primary | ICD-10-CM

## 2022-06-07 PROCEDURE — 92014 PR EYE EXAM, EST PATIENT,COMPREHESV: ICD-10-PCS | Mod: S$GLB,,, | Performed by: OPTOMETRIST

## 2022-06-07 PROCEDURE — 99999 PR PBB SHADOW E&M-EST. PATIENT-LVL III: ICD-10-PCS | Mod: PBBFAC,,, | Performed by: OPTOMETRIST

## 2022-06-07 PROCEDURE — 92014 COMPRE OPH EXAM EST PT 1/>: CPT | Mod: S$GLB,,, | Performed by: OPTOMETRIST

## 2022-06-07 PROCEDURE — 92015 DETERMINE REFRACTIVE STATE: CPT | Mod: S$GLB,,, | Performed by: OPTOMETRIST

## 2022-06-07 PROCEDURE — 1159F PR MEDICATION LIST DOCUMENTED IN MEDICAL RECORD: ICD-10-PCS | Mod: CPTII,S$GLB,, | Performed by: OPTOMETRIST

## 2022-06-07 PROCEDURE — 92310 CONTACT LENS FITTING OU: CPT | Mod: S$GLB,,, | Performed by: OPTOMETRIST

## 2022-06-07 PROCEDURE — 92310 PR CONTACT LENS FITTING (NO CHANGE): ICD-10-PCS | Mod: S$GLB,,, | Performed by: OPTOMETRIST

## 2022-06-07 PROCEDURE — 1159F MED LIST DOCD IN RCRD: CPT | Mod: CPTII,S$GLB,, | Performed by: OPTOMETRIST

## 2022-06-07 PROCEDURE — 99499 UNLISTED E&M SERVICE: CPT | Mod: S$GLB,,, | Performed by: OPTOMETRIST

## 2022-06-07 PROCEDURE — 99999 PR PBB SHADOW E&M-EST. PATIENT-LVL III: CPT | Mod: PBBFAC,,, | Performed by: OPTOMETRIST

## 2022-06-07 PROCEDURE — 92015 PR REFRACTION: ICD-10-PCS | Mod: S$GLB,,, | Performed by: OPTOMETRIST

## 2022-06-07 PROCEDURE — 99499 NO LOS: ICD-10-PCS | Mod: S$GLB,,, | Performed by: OPTOMETRIST

## 2022-06-07 RX ORDER — PROGESTERONE 200 MG/1
200 CAPSULE ORAL NIGHTLY
COMMUNITY
Start: 2022-03-25 | End: 2023-04-20

## 2022-06-07 NOTE — PATIENT INSTRUCTIONS
"DRY EYES -- BURNING OR LIGIA SYMPTOMS:  Use Over The Counter artificial tears as needed for dry eye symptoms.   Some common brands include:  Systane, Optive, Refresh, and Thera-Tears.  These drops can be used as frequently as desired, but may be most helpful use during long periods of concentrated work.  For example, reading / working at the computer. Start with 3-4x per day.     Nighttime Ophthalmic gel or ointments are available: Refresh PM, Genteal, and Lacrilube.    Avoid drops that "get redness out" (Visine, Murine, Clear Eyes), as these may contain medication that could further irritate the eyes, especially with chronic use.    ALLERGY EYES -- ITCHING SYMPTOMS:  Over the counter medications include--Pataday, Zaditor, and Alaway.  Use as directed 1-2 drops daily for symptoms of itching / watering eyes.  These drops will not help for dry eye or exposure symptoms.    REDNESS RELIEF:  Lumify---is a good redness reliever that will not cause irritation if used chronically.        FLASHES / FLOATERS / POSTERIOR VITREOUS DETACHMENT    Call the clinic if you have any further changes in symptoms.  Including:  Increased numbers of floaters or flashing lights, dimness or darkness that moves through or stays constant in your vision, or any pain in the eye (s).    You may sometimes see small specks or clouds moving in your field of vision.  They are called FLOATERS.  You can often see them when looking at a plain background, like a blank wall or blue osmin.  Floaters are actually tiny clumps of gel or cells inside the VITREOUS, the clear jelly-like fluid that fills the inside of your eye.    While these objects look like they are in front of your eye, they are actually floating inside.  What you see are the shadows they cast on the RETINA, the nerve layer at the back of the eye that senses light and allows you to see.      POSTERIOR VITREOUS DETACHMENT    The appearance of new floaters may be alarming.  If you suddenly " develop new floaters, you should contact your eye care professional  right away.    The retina can tear if the shrinking vitreous pulls away from the wall of the eye.  This sometimes causes a small amount of bleeding in the eye that may appear as new floaters.    A torn retina is always a serious problem, since it can lead to a retinal detachment.  You should see your eye care professional as soon as possible if:    even one new floater appears suddenly;  you see sudden flashes of light;  you notice other symptoms, like the loss of side vision, or a curtain closes down in your vision        POSTERIOR VITREOUS DETACHMENT is more common for people who:    are nearsighted;  have had cataract surgery;  have had YAG laser surgery of the eye;  have had inflammation inside the eye;  are over age 60.      While some floaters may remain visible, many of them will fade over time and become less noticeable.  Even if you've had some floaters for years, you should have your eyes checked as soon as possible if you notice new ones.    FLASHING LIGHTS    When the vitreous gel rubs or pulls on the retina, you may see what look like flashing lights or lightning streaks.  These flashes can appear off and on for several weeks or months.      Some people experience flashes of light that appear as jagged lines or heat waves in both eyes, lasting 10-20 minutes.  These flashes are caused by a spasm of blood vessels in the brain, which is called a migraine.    If a headache follows these flashes, it's called a migraine headache.  If   no headache occurs, these flashes are called Ophthalmic or Ocular Migraine.           DAILY WEAR CONTACT LENSES  Continue with Daily Wear of contact lenses, up to all waking hours.  Continue with approved contact lens disinfection / rewetting drops as discussed.  Dispose of lenses monthly.  Stop wearing your lenses and call our office if redness, blurred vision, or pain persists more than 12 hours.  We  recommend an annual eye exam for contact lens patients.

## 2022-06-07 NOTE — PROGRESS NOTES
HPI     Pt here for annual eye exam with contacts. LDE- 05/27/2021    Pt sts OD is a little dry today but coming up on her monthly change for   CL. Pt sts she typically changes them on the 9th of the month. Pt likes   comfort and brand normally. Pt sts occasionally she feels like she has a   lash in corners of eyes, OU. Pt denies flashes/floaters. Pt uses AT PRN.     Last edited by Chanel Amos on 6/7/2022  1:57 PM. (History)        ROS     Positive for: Eyes    Negative for: Constitutional, Gastrointestinal, Neurological, Skin,   Genitourinary, Musculoskeletal, HENT, Endocrine, Cardiovascular,   Respiratory, Psychiatric, Allergic/Imm, Heme/Lymph    Last edited by AMRIT Ames, OD on 6/7/2022  2:17 PM. (History)        Assessment /Plan     For exam results, see Encounter Report.    Examination of eyes and vision    Myopia with astigmatism and presbyopia, bilateral    Contact lens/glasses fitting    Vitreous floaters, bilateral      1. Ocular health exam OU   2. Updated specs rx, gave copy---slight over minus from previous   3. Updated clrx, gave copy, no changes  4. Mild OU w/ moderate -high myopia     DAILY WEAR CONTACT LENSES  Continue with Daily Wear of contact lenses, up to all waking hours.  Continue with approved contact lens disinfection / rewetting drops as discussed.  Dispose of lenses monthly.  Stop wearing your lenses and call our office if redness, blurred vision, or pain persists more than 12 hours.  We recommend an annual eye exam for contact lens patients.      Discussed and educated patient on current findings /plan.  RTC 1 year, prn if any changes / issues

## 2022-06-17 ENCOUNTER — PATIENT MESSAGE (OUTPATIENT)
Dept: OPTOMETRY | Facility: CLINIC | Age: 47
End: 2022-06-17
Payer: COMMERCIAL

## 2022-06-20 ENCOUNTER — TELEPHONE (OUTPATIENT)
Dept: OPHTHALMOLOGY | Facility: CLINIC | Age: 47
End: 2022-06-20
Payer: COMMERCIAL

## 2022-07-22 ENCOUNTER — OFFICE VISIT (OUTPATIENT)
Dept: FAMILY MEDICINE | Facility: CLINIC | Age: 47
End: 2022-07-22
Payer: COMMERCIAL

## 2022-07-22 VITALS
SYSTOLIC BLOOD PRESSURE: 112 MMHG | HEART RATE: 86 BPM | OXYGEN SATURATION: 97 % | WEIGHT: 161.81 LBS | BODY MASS INDEX: 27.63 KG/M2 | HEIGHT: 64 IN | DIASTOLIC BLOOD PRESSURE: 72 MMHG

## 2022-07-22 DIAGNOSIS — B37.31 VAGINAL CANDIDA: ICD-10-CM

## 2022-07-22 DIAGNOSIS — J01.80 OTHER ACUTE SINUSITIS, RECURRENCE NOT SPECIFIED: Primary | ICD-10-CM

## 2022-07-22 PROCEDURE — 1160F PR REVIEW ALL MEDS BY PRESCRIBER/CLIN PHARMACIST DOCUMENTED: ICD-10-PCS | Mod: CPTII,S$GLB,, | Performed by: PHYSICIAN ASSISTANT

## 2022-07-22 PROCEDURE — 3078F PR MOST RECENT DIASTOLIC BLOOD PRESSURE < 80 MM HG: ICD-10-PCS | Mod: CPTII,S$GLB,, | Performed by: PHYSICIAN ASSISTANT

## 2022-07-22 PROCEDURE — 99213 PR OFFICE/OUTPT VISIT, EST, LEVL III, 20-29 MIN: ICD-10-PCS | Mod: S$GLB,,, | Performed by: PHYSICIAN ASSISTANT

## 2022-07-22 PROCEDURE — 1159F PR MEDICATION LIST DOCUMENTED IN MEDICAL RECORD: ICD-10-PCS | Mod: CPTII,S$GLB,, | Performed by: PHYSICIAN ASSISTANT

## 2022-07-22 PROCEDURE — 3074F SYST BP LT 130 MM HG: CPT | Mod: CPTII,S$GLB,, | Performed by: PHYSICIAN ASSISTANT

## 2022-07-22 PROCEDURE — 3008F BODY MASS INDEX DOCD: CPT | Mod: CPTII,S$GLB,, | Performed by: PHYSICIAN ASSISTANT

## 2022-07-22 PROCEDURE — 3078F DIAST BP <80 MM HG: CPT | Mod: CPTII,S$GLB,, | Performed by: PHYSICIAN ASSISTANT

## 2022-07-22 PROCEDURE — 99999 PR PBB SHADOW E&M-EST. PATIENT-LVL IV: CPT | Mod: PBBFAC,,, | Performed by: PHYSICIAN ASSISTANT

## 2022-07-22 PROCEDURE — 3074F PR MOST RECENT SYSTOLIC BLOOD PRESSURE < 130 MM HG: ICD-10-PCS | Mod: CPTII,S$GLB,, | Performed by: PHYSICIAN ASSISTANT

## 2022-07-22 PROCEDURE — 99213 OFFICE O/P EST LOW 20 MIN: CPT | Mod: S$GLB,,, | Performed by: PHYSICIAN ASSISTANT

## 2022-07-22 PROCEDURE — 3008F PR BODY MASS INDEX (BMI) DOCUMENTED: ICD-10-PCS | Mod: CPTII,S$GLB,, | Performed by: PHYSICIAN ASSISTANT

## 2022-07-22 PROCEDURE — 1159F MED LIST DOCD IN RCRD: CPT | Mod: CPTII,S$GLB,, | Performed by: PHYSICIAN ASSISTANT

## 2022-07-22 PROCEDURE — 1160F RVW MEDS BY RX/DR IN RCRD: CPT | Mod: CPTII,S$GLB,, | Performed by: PHYSICIAN ASSISTANT

## 2022-07-22 PROCEDURE — 99999 PR PBB SHADOW E&M-EST. PATIENT-LVL IV: ICD-10-PCS | Mod: PBBFAC,,, | Performed by: PHYSICIAN ASSISTANT

## 2022-07-22 RX ORDER — FLUCONAZOLE 150 MG/1
150 TABLET ORAL DAILY
Qty: 1 TABLET | Refills: 0 | Status: SHIPPED | OUTPATIENT
Start: 2022-07-22 | End: 2022-07-23

## 2022-07-22 RX ORDER — AMOXICILLIN AND CLAVULANATE POTASSIUM 875; 125 MG/1; MG/1
1 TABLET, FILM COATED ORAL EVERY 12 HOURS
Qty: 14 TABLET | Refills: 0 | Status: SHIPPED | OUTPATIENT
Start: 2022-07-22 | End: 2022-07-29

## 2022-07-22 NOTE — PROGRESS NOTES
"Subjective:      Patient ID: Rita Grant is a 47 y.o. female.    Chief Complaint: Facial Pain (For 3 weeks; tenderness in nose; radiating down face to top 2 teeth; left side; sharp pain)    Patient is new to me.    HPI   Patient has PMH of Rodriguez-en-Y gastric bypass.    Patient reports left nostril irritation radiating to front teeth for 3 weeks. No bleeding or bump felt in nose.  Patient reports congestion nightly, postnasal drip, rhinorrhea, and environmental allergies.  Patient denies fever or cough.    Review of Systems   Constitutional: Negative for appetite change, chills and fever.   HENT: Positive for congestion (nightly), postnasal drip and rhinorrhea.    Respiratory: Negative for cough and shortness of breath.    Cardiovascular: Negative for chest pain.   Allergic/Immunologic: Positive for environmental allergies.   Neurological: Positive for dizziness, light-headedness and headaches (baseline).          /72   Pulse 86   Ht 5' 4" (1.626 m)   Wt 73.4 kg (161 lb 13.1 oz)   SpO2 97%   BMI 27.78 kg/m²      Physical Exam  Vitals reviewed.   Constitutional:       General: She is not in acute distress.     Appearance: Normal appearance. She is well-developed and well-groomed.   HENT:      Head: Normocephalic and atraumatic.      Right Ear: Hearing, tympanic membrane, ear canal and external ear normal.      Left Ear: Hearing, tympanic membrane, ear canal and external ear normal.      Nose: Nose normal.      Right Turbinates: Swollen.      Left Turbinates: Swollen.      Right Sinus: No maxillary sinus tenderness or frontal sinus tenderness.      Left Sinus: No maxillary sinus tenderness or frontal sinus tenderness.      Mouth/Throat:      Lips: Pink.      Mouth: Mucous membranes are moist.      Pharynx: Oropharynx is clear.   Eyes:      General: Lids are normal.      Conjunctiva/sclera: Conjunctivae normal.   Neck:      Trachea: Phonation normal.   Cardiovascular:      Rate and Rhythm: Normal rate and " regular rhythm.      Heart sounds: Normal heart sounds. No murmur heard.    No friction rub. No gallop.   Pulmonary:      Effort: Pulmonary effort is normal. No respiratory distress.      Breath sounds: Normal breath sounds. No decreased breath sounds, wheezing, rhonchi or rales.   Musculoskeletal:         General: Normal range of motion.      Cervical back: Normal range of motion.   Lymphadenopathy:      Cervical: No cervical adenopathy.   Skin:     General: Skin is warm and dry.      Findings: No rash.   Neurological:      General: No focal deficit present.      Mental Status: She is alert and oriented to person, place, and time.   Psychiatric:         Attention and Perception: Attention normal.         Mood and Affect: Mood normal.         Speech: Speech normal.         Behavior: Behavior normal. Behavior is cooperative.         Cognition and Memory: Cognition normal.         Judgment: Judgment normal.        Assessment:      1. Other acute sinusitis, recurrence not specified       Plan:   1. Other acute sinusitis, recurrence not specified  Take antibiotic with food.  - amoxicillin-clavulanate 875-125mg (AUGMENTIN) 875-125 mg per tablet; Take 1 tablet by mouth every 12 (twelve) hours. for 7 days  Dispense: 14 tablet; Refill: 0    2. Vaginal candida  - fluconazole (DIFLUCAN) 150 MG Tab; Take 1 tablet (150 mg total) by mouth once daily. for 1 day  Dispense: 1 tablet; Refill: 0    Follow up as needed.  Patient agreed with plan and expressed understanding.    Thank you for allowing me to serve you,

## 2022-08-01 ENCOUNTER — PATIENT MESSAGE (OUTPATIENT)
Dept: OPTOMETRY | Facility: CLINIC | Age: 47
End: 2022-08-01
Payer: COMMERCIAL

## 2022-09-30 ENCOUNTER — LAB VISIT (OUTPATIENT)
Dept: LAB | Facility: HOSPITAL | Age: 47
End: 2022-09-30
Payer: COMMERCIAL

## 2022-09-30 DIAGNOSIS — R30.0 DYSURIA: ICD-10-CM

## 2022-09-30 DIAGNOSIS — R30.0 DYSURIA: Primary | ICD-10-CM

## 2022-09-30 LAB
BILIRUB UR QL STRIP: NEGATIVE
CLARITY UR: CLEAR
COLOR UR: YELLOW
GLUCOSE UR QL STRIP: NEGATIVE
HGB UR QL STRIP: NEGATIVE
KETONES UR QL STRIP: NEGATIVE
LEUKOCYTE ESTERASE UR QL STRIP: NEGATIVE
NITRITE UR QL STRIP: NEGATIVE
PH UR STRIP: 6 [PH] (ref 5–8)
PROT UR QL STRIP: NEGATIVE
SP GR UR STRIP: <=1.005 (ref 1–1.03)
URN SPEC COLLECT METH UR: ABNORMAL

## 2022-09-30 PROCEDURE — 81003 URINALYSIS AUTO W/O SCOPE: CPT | Mod: PO | Performed by: PHYSICIAN ASSISTANT

## 2022-09-30 RX ORDER — FLUCONAZOLE 150 MG/1
150 TABLET ORAL DAILY
Qty: 1 TABLET | Refills: 0 | Status: SHIPPED | OUTPATIENT
Start: 2022-09-30 | End: 2022-10-01

## 2022-09-30 RX ORDER — NITROFURANTOIN 25; 75 MG/1; MG/1
100 CAPSULE ORAL 2 TIMES DAILY
Qty: 10 CAPSULE | Refills: 0 | Status: SHIPPED | OUTPATIENT
Start: 2022-09-30 | End: 2022-10-05

## 2022-09-30 NOTE — PROGRESS NOTES
Patient continues to have dysuria despite clear urinalysis.  Will treat with macrobid.  Gave diflucan for possible yeast infection.    Saranya Harrell PA-C

## 2022-11-07 DIAGNOSIS — J30.0 VASOMOTOR RHINITIS: Primary | ICD-10-CM

## 2022-11-07 RX ORDER — IPRATROPIUM BROMIDE 0.5 MG/2.5ML
500 SOLUTION RESPIRATORY (INHALATION) 4 TIMES DAILY
Qty: 75 ML | Refills: 0 | OUTPATIENT
Start: 2022-11-07 | End: 2023-11-07

## 2022-11-07 RX ORDER — IPRATROPIUM BROMIDE 42 UG/1
2 SPRAY, METERED NASAL 3 TIMES DAILY
Qty: 15 ML | Refills: 2 | Status: SHIPPED | OUTPATIENT
Start: 2022-11-07 | End: 2023-01-31

## 2022-11-07 NOTE — TELEPHONE ENCOUNTER
Pt has been feeling congested runny nose after eating. Pt is requesting atrovent nasal spray. Please advise. Thank you

## 2022-11-07 NOTE — TELEPHONE ENCOUNTER
Care Due:                  Date            Visit Type   Department     Provider  --------------------------------------------------------------------------------                                EP -                              PRIMARY      [facility]  Last Visit: 12-      CARE ([facility])   MEDICINE       Sonja Hewitt                              MYCHART                              FOLLOWUP/OF  [facility]  Next Visit: 12-      [facility] VISIT   MEDICINE       Sonja Hewitt                                                            Last  Test          Frequency    Reason                     Performed    Due Date  --------------------------------------------------------------------------------    HBA1C.......  6 months...  semaglutide..............  Not Found    Overdue    [employer] Embedded Care Gaps. Reference number: [id number]. 11/07/2022   2:45:43 PM CST

## 2022-11-23 ENCOUNTER — OFFICE VISIT (OUTPATIENT)
Dept: OPTOMETRY | Facility: CLINIC | Age: 47
End: 2022-11-23
Payer: COMMERCIAL

## 2022-11-23 DIAGNOSIS — H52.203 MYOPIA WITH ASTIGMATISM AND PRESBYOPIA, BILATERAL: Primary | ICD-10-CM

## 2022-11-23 DIAGNOSIS — H52.13 MYOPIA WITH ASTIGMATISM AND PRESBYOPIA, BILATERAL: Primary | ICD-10-CM

## 2022-11-23 DIAGNOSIS — H52.4 MYOPIA WITH ASTIGMATISM AND PRESBYOPIA, BILATERAL: Primary | ICD-10-CM

## 2022-11-23 PROCEDURE — 99999 PR PBB SHADOW E&M-EST. PATIENT-LVL II: ICD-10-PCS | Mod: PBBFAC,,, | Performed by: OPTOMETRIST

## 2022-11-23 PROCEDURE — 99499 UNLISTED E&M SERVICE: CPT | Mod: S$GLB,,, | Performed by: OPTOMETRIST

## 2022-11-23 PROCEDURE — 99999 PR PBB SHADOW E&M-EST. PATIENT-LVL II: CPT | Mod: PBBFAC,,, | Performed by: OPTOMETRIST

## 2022-11-23 PROCEDURE — 99499 NO LOS: ICD-10-PCS | Mod: S$GLB,,, | Performed by: OPTOMETRIST

## 2022-11-23 NOTE — PROGRESS NOTES
HPI    Glasses re check    Pt got glasses last Wednesday and wore them for about 1 day. Pt sts   glasses are not made correct. Pt has bad vision near and far.   Last edited by Chanel Amos on 11/23/2022  1:36 PM.        ROS    Positive for: Eyes  Negative for: Constitutional, Gastrointestinal, Neurological, Skin,   Genitourinary, Musculoskeletal, HENT, Endocrine, Cardiovascular,   Respiratory, Psychiatric, Allergic/Imm, Heme/Lymph  Last edited by AMRIT Ames, OD on 11/23/2022  1:44 PM.        Assessment /Plan     For exam results, see Encounter Report.    Myopia with astigmatism and presbyopia, bilateral      Uncomfortable with PAL   Recheck cyl / refraction   Remake SVL only     Review presbyopia s/s     Message if issues

## 2022-11-29 ENCOUNTER — PATIENT MESSAGE (OUTPATIENT)
Dept: FAMILY MEDICINE | Facility: CLINIC | Age: 47
End: 2022-11-29
Payer: COMMERCIAL

## 2022-11-29 DIAGNOSIS — Z00.00 WELL ADULT EXAM: Primary | ICD-10-CM

## 2022-11-29 DIAGNOSIS — E55.9 VITAMIN D DEFICIENCY: ICD-10-CM

## 2022-11-29 DIAGNOSIS — E53.8 VITAMIN B 12 DEFICIENCY: ICD-10-CM

## 2022-11-29 DIAGNOSIS — Z98.84 HISTORY OF ROUX-EN-Y GASTRIC BYPASS: ICD-10-CM

## 2022-11-30 ENCOUNTER — LAB VISIT (OUTPATIENT)
Dept: LAB | Facility: HOSPITAL | Age: 47
End: 2022-11-30
Attending: INTERNAL MEDICINE
Payer: COMMERCIAL

## 2022-11-30 ENCOUNTER — PATIENT MESSAGE (OUTPATIENT)
Dept: FAMILY MEDICINE | Facility: CLINIC | Age: 47
End: 2022-11-30
Payer: COMMERCIAL

## 2022-11-30 DIAGNOSIS — Z98.84 HISTORY OF ROUX-EN-Y GASTRIC BYPASS: ICD-10-CM

## 2022-11-30 DIAGNOSIS — Z00.00 WELL ADULT EXAM: ICD-10-CM

## 2022-11-30 LAB
25(OH)D3+25(OH)D2 SERPL-MCNC: 32 NG/ML (ref 30–96)
ALBUMIN SERPL BCP-MCNC: 4.1 G/DL (ref 3.5–5.2)
ALP SERPL-CCNC: 64 U/L (ref 55–135)
ALT SERPL W/O P-5'-P-CCNC: 19 U/L (ref 10–44)
ANION GAP SERPL CALC-SCNC: 8 MMOL/L (ref 8–16)
AST SERPL-CCNC: 22 U/L (ref 10–40)
BASOPHILS # BLD AUTO: 0.04 K/UL (ref 0–0.2)
BASOPHILS NFR BLD: 0.7 % (ref 0–1.9)
BILIRUB SERPL-MCNC: 0.3 MG/DL (ref 0.1–1)
BUN SERPL-MCNC: 10 MG/DL (ref 6–20)
CALCIUM SERPL-MCNC: 9.4 MG/DL (ref 8.7–10.5)
CHLORIDE SERPL-SCNC: 105 MMOL/L (ref 95–110)
CHOLEST SERPL-MCNC: 236 MG/DL (ref 120–199)
CHOLEST/HDLC SERPL: 3 {RATIO} (ref 2–5)
CO2 SERPL-SCNC: 25 MMOL/L (ref 23–29)
CREAT SERPL-MCNC: 0.8 MG/DL (ref 0.5–1.4)
DIFFERENTIAL METHOD: ABNORMAL
EOSINOPHIL # BLD AUTO: 0.1 K/UL (ref 0–0.5)
EOSINOPHIL NFR BLD: 2 % (ref 0–8)
ERYTHROCYTE [DISTWIDTH] IN BLOOD BY AUTOMATED COUNT: 13.4 % (ref 11.5–14.5)
EST. GFR  (NO RACE VARIABLE): >60 ML/MIN/1.73 M^2
ESTIMATED AVG GLUCOSE: 97 MG/DL (ref 68–131)
FERRITIN SERPL-MCNC: 12 NG/ML (ref 20–300)
GLUCOSE SERPL-MCNC: 85 MG/DL (ref 70–110)
HBA1C MFR BLD: 5 % (ref 4–5.6)
HCT VFR BLD AUTO: 41.3 % (ref 37–48.5)
HDLC SERPL-MCNC: 78 MG/DL (ref 40–75)
HDLC SERPL: 33.1 % (ref 20–50)
HGB BLD-MCNC: 13 G/DL (ref 12–16)
IMM GRANULOCYTES # BLD AUTO: 0.01 K/UL (ref 0–0.04)
IMM GRANULOCYTES NFR BLD AUTO: 0.2 % (ref 0–0.5)
IRON SERPL-MCNC: 81 UG/DL (ref 30–160)
LDLC SERPL CALC-MCNC: 143.6 MG/DL (ref 63–159)
LYMPHOCYTES # BLD AUTO: 2.1 K/UL (ref 1–4.8)
LYMPHOCYTES NFR BLD: 34.3 % (ref 18–48)
MCH RBC QN AUTO: 30.4 PG (ref 27–31)
MCHC RBC AUTO-ENTMCNC: 31.5 G/DL (ref 32–36)
MCV RBC AUTO: 97 FL (ref 82–98)
MONOCYTES # BLD AUTO: 0.4 K/UL (ref 0.3–1)
MONOCYTES NFR BLD: 7.2 % (ref 4–15)
NEUTROPHILS # BLD AUTO: 3.4 K/UL (ref 1.8–7.7)
NEUTROPHILS NFR BLD: 55.6 % (ref 38–73)
NONHDLC SERPL-MCNC: 158 MG/DL
NRBC BLD-RTO: 0 /100 WBC
PLATELET # BLD AUTO: 224 K/UL (ref 150–450)
PMV BLD AUTO: 10.7 FL (ref 9.2–12.9)
POTASSIUM SERPL-SCNC: 4.5 MMOL/L (ref 3.5–5.1)
PROT SERPL-MCNC: 7.2 G/DL (ref 6–8.4)
RBC # BLD AUTO: 4.28 M/UL (ref 4–5.4)
SATURATED IRON: 21 % (ref 20–50)
SODIUM SERPL-SCNC: 138 MMOL/L (ref 136–145)
TOTAL IRON BINDING CAPACITY: 389 UG/DL (ref 250–450)
TRANSFERRIN SERPL-MCNC: 263 MG/DL (ref 200–375)
TRIGL SERPL-MCNC: 72 MG/DL (ref 30–150)
TSH SERPL DL<=0.005 MIU/L-ACNC: 3.03 UIU/ML (ref 0.4–4)
VIT B12 SERPL-MCNC: 787 PG/ML (ref 210–950)
WBC # BLD AUTO: 6.15 K/UL (ref 3.9–12.7)

## 2022-11-30 PROCEDURE — 80061 LIPID PANEL: CPT | Performed by: INTERNAL MEDICINE

## 2022-11-30 PROCEDURE — 36415 COLL VENOUS BLD VENIPUNCTURE: CPT | Mod: PO | Performed by: INTERNAL MEDICINE

## 2022-11-30 PROCEDURE — 85025 COMPLETE CBC W/AUTO DIFF WBC: CPT | Performed by: INTERNAL MEDICINE

## 2022-11-30 PROCEDURE — 83036 HEMOGLOBIN GLYCOSYLATED A1C: CPT | Performed by: INTERNAL MEDICINE

## 2022-11-30 PROCEDURE — 84466 ASSAY OF TRANSFERRIN: CPT | Performed by: INTERNAL MEDICINE

## 2022-11-30 PROCEDURE — 84443 ASSAY THYROID STIM HORMONE: CPT | Performed by: INTERNAL MEDICINE

## 2022-11-30 PROCEDURE — 82306 VITAMIN D 25 HYDROXY: CPT | Performed by: INTERNAL MEDICINE

## 2022-11-30 PROCEDURE — 80053 COMPREHEN METABOLIC PANEL: CPT | Performed by: INTERNAL MEDICINE

## 2022-11-30 PROCEDURE — 82607 VITAMIN B-12: CPT | Performed by: INTERNAL MEDICINE

## 2022-11-30 PROCEDURE — 82728 ASSAY OF FERRITIN: CPT | Performed by: INTERNAL MEDICINE

## 2022-12-02 ENCOUNTER — OFFICE VISIT (OUTPATIENT)
Dept: FAMILY MEDICINE | Facility: CLINIC | Age: 47
End: 2022-12-02
Payer: COMMERCIAL

## 2022-12-02 VITALS
HEIGHT: 65 IN | SYSTOLIC BLOOD PRESSURE: 112 MMHG | HEART RATE: 87 BPM | DIASTOLIC BLOOD PRESSURE: 72 MMHG | OXYGEN SATURATION: 98 % | WEIGHT: 151.38 LBS | RESPIRATION RATE: 20 BRPM | BODY MASS INDEX: 25.22 KG/M2 | TEMPERATURE: 98 F

## 2022-12-02 DIAGNOSIS — Z00.00 WELL ADULT EXAM: Primary | ICD-10-CM

## 2022-12-02 DIAGNOSIS — Z98.84 HISTORY OF ROUX-EN-Y GASTRIC BYPASS: ICD-10-CM

## 2022-12-02 DIAGNOSIS — R51.9 MIXED HEADACHE: ICD-10-CM

## 2022-12-02 DIAGNOSIS — N92.6 IRREGULAR PERIODS: ICD-10-CM

## 2022-12-02 DIAGNOSIS — E53.8 VITAMIN B 12 DEFICIENCY: ICD-10-CM

## 2022-12-02 DIAGNOSIS — E55.9 VITAMIN D DEFICIENCY: ICD-10-CM

## 2022-12-02 DIAGNOSIS — E78.5 HYPERLIPIDEMIA, UNSPECIFIED HYPERLIPIDEMIA TYPE: ICD-10-CM

## 2022-12-02 PROCEDURE — 3008F BODY MASS INDEX DOCD: CPT | Mod: CPTII,S$GLB,, | Performed by: INTERNAL MEDICINE

## 2022-12-02 PROCEDURE — 99396 PR PREVENTIVE VISIT,EST,40-64: ICD-10-PCS | Mod: S$GLB,,, | Performed by: INTERNAL MEDICINE

## 2022-12-02 PROCEDURE — 1160F RVW MEDS BY RX/DR IN RCRD: CPT | Mod: CPTII,S$GLB,, | Performed by: INTERNAL MEDICINE

## 2022-12-02 PROCEDURE — 3074F PR MOST RECENT SYSTOLIC BLOOD PRESSURE < 130 MM HG: ICD-10-PCS | Mod: CPTII,S$GLB,, | Performed by: INTERNAL MEDICINE

## 2022-12-02 PROCEDURE — 3044F PR MOST RECENT HEMOGLOBIN A1C LEVEL <7.0%: ICD-10-PCS | Mod: CPTII,S$GLB,, | Performed by: INTERNAL MEDICINE

## 2022-12-02 PROCEDURE — 3074F SYST BP LT 130 MM HG: CPT | Mod: CPTII,S$GLB,, | Performed by: INTERNAL MEDICINE

## 2022-12-02 PROCEDURE — 1159F PR MEDICATION LIST DOCUMENTED IN MEDICAL RECORD: ICD-10-PCS | Mod: CPTII,S$GLB,, | Performed by: INTERNAL MEDICINE

## 2022-12-02 PROCEDURE — 3078F PR MOST RECENT DIASTOLIC BLOOD PRESSURE < 80 MM HG: ICD-10-PCS | Mod: CPTII,S$GLB,, | Performed by: INTERNAL MEDICINE

## 2022-12-02 PROCEDURE — 3008F PR BODY MASS INDEX (BMI) DOCUMENTED: ICD-10-PCS | Mod: CPTII,S$GLB,, | Performed by: INTERNAL MEDICINE

## 2022-12-02 PROCEDURE — 3044F HG A1C LEVEL LT 7.0%: CPT | Mod: CPTII,S$GLB,, | Performed by: INTERNAL MEDICINE

## 2022-12-02 PROCEDURE — 1160F PR REVIEW ALL MEDS BY PRESCRIBER/CLIN PHARMACIST DOCUMENTED: ICD-10-PCS | Mod: CPTII,S$GLB,, | Performed by: INTERNAL MEDICINE

## 2022-12-02 PROCEDURE — 99396 PREV VISIT EST AGE 40-64: CPT | Mod: S$GLB,,, | Performed by: INTERNAL MEDICINE

## 2022-12-02 PROCEDURE — 1159F MED LIST DOCD IN RCRD: CPT | Mod: CPTII,S$GLB,, | Performed by: INTERNAL MEDICINE

## 2022-12-02 PROCEDURE — 3078F DIAST BP <80 MM HG: CPT | Mod: CPTII,S$GLB,, | Performed by: INTERNAL MEDICINE

## 2022-12-02 RX ORDER — METFORMIN HYDROCHLORIDE 500 MG/1
1000 TABLET, EXTENDED RELEASE ORAL
COMMUNITY
Start: 2022-09-03 | End: 2023-04-20

## 2022-12-02 NOTE — PROGRESS NOTES
Subjective:       Patient ID: Rita Grant is a 47 y.o. female.    Medication List with Changes/Refills   Current Medications    BIOTIN 10 MG TAB    once daily.     DIPHENHYDRAMINE (BENADRYL) 50 MG CAPSULE    Take 50 mg by mouth every 6 (six) hours as needed for Itching.    ERGOCALCIFEROL, VITAMIN D2, (VITAMIN D ORAL)    once daily.     INTRAROSA 6.5 MG INST        IPRATROPIUM (ATROVENT) 42 MCG (0.06 %) NASAL SPRAY    2 sprays by Nasal route 3 (three) times daily. 30 minutes before meals    MAGNESIUM 250 MG TAB    once daily.     MELATONIN 10 MG CAP    every evening.     METFORMIN (GLUCOPHAGE-XR) 500 MG ER 24HR TABLET    Take 1,000 mg by mouth.    PROGESTERONE (PROMETRIUM) 200 MG CAPSULE    Take 200 mg by mouth nightly.    SEMAGLUTIDE (OZEMPIC) 0.25 MG OR 0.5 MG(2 MG/1.5 ML) PEN INJECTOR    Inject  0.25 mg subcutaneously weekly for 2 weeks then increase to 0.5 mg weekly thereafter.    TRANEXAMIC ACID ORAL        VALACYCLOVIR (VALTREX) 1000 MG TABLET    Take 1,000 mg by mouth 2 (two) times daily as needed.       Chief Complaint: Annual Exam  She is here today for her annual exam.      She has hyperlipidemia noted on her lipids on 7/2021 were 295/97/88/187. Repeat lipids after weight loss on 11/2022 were 236/72/78/143.      She has history of bypass surgery (Rodriguez--Y) in 2001. She denies any complications since the bypass surgery. Labs on 11/2022 show vitamin B12 787, normal iron  21 and H+H  13/41 but low ferritin of 12. Vitamin D of 32. She is taking OTC vitamin D supplementation.     She is using ozempic 0.5 mg weekly and metformin xr 500 mg daily for weight loss. She has lost over 20  lbs in 6 months. She is tolerating well. HbA1c was 5.0 on 11/2022.     She complains of chronic headaches since a teenager that are worsening. She is now having daily headaches that come either from her neck or her eyes. They are described as frontal headaches bilateral that throb. Worse with lights and working on computer. Better  with tylenol. They are occasionally associated with nausea but no vomiting. She would like to see headache clinic. She does take magnesium daily.     She complains of no period in 81 days. She has progesterone and intrarosa but does not regularly use. She denies pregnancy.  She does report in the past when she lost weight her periods became irregular.      She lives with her  and 4 children. She feels safe at home. She exercises regularly. She eats healthy. She is a nurse with [employer].       Fecal kit----3/2022 neg   Mammogram----8/2022 neg  Pap-----1/2020 negative HPV neg  Tdap---10/2017  Influenza vaccine---10/2022  Covid vaccine---1 dose     Review of Systems   Constitutional:  Negative for activity change, appetite change, fatigue, fever and unexpected weight change.   HENT:  Negative for congestion, ear pain, hearing loss, rhinorrhea, sore throat and trouble swallowing.    Eyes:  Negative for pain, discharge and visual disturbance.   Respiratory:  Negative for cough, chest tightness, shortness of breath and wheezing.    Cardiovascular:  Negative for chest pain, palpitations and leg swelling.   Gastrointestinal:  Positive for constipation and diarrhea. Negative for abdominal pain, blood in stool, nausea and vomiting.   Endocrine: Negative for polydipsia and polyuria.   Genitourinary:  Negative for difficulty urinating, dysuria, hematuria and menstrual problem.   Musculoskeletal:  Negative for arthralgias, back pain, joint swelling, myalgias and neck pain.   Skin:  Negative for rash.   Neurological:  Positive for headaches. Negative for dizziness, weakness and numbness.   Hematological:  Does not bruise/bleed easily.   Psychiatric/Behavioral:  Negative for confusion, dysphoric mood, sleep disturbance and suicidal ideas. The patient is not nervous/anxious.      Objective:      Vitals:    12/02/22 0746   BP: 112/72   Pulse: 87   Resp: 20   Temp: 98.4 °F (36.9 °C)   SpO2: 98%   Weight: 68.6 kg (151 lb 5.5 oz)  "  Height: 5' 4.5" (1.638 m)     Body mass index is 25.58 kg/m².  Physical Exam    General appearance: No acute distress, cooperative  Eyes: PERRL, EOMI, conjunctiva clear  Ears: normal external ear and pinna, tm clear without drainage, canals clear  Nose: Normal mucosa without drainage  Throat: no exudates or erythema, tonsils not enlarged  Mouth: no sores or lesions, moist mucous membranes  Neck: FROM, soft, supple, no thyromegaly, no bruits  Lymph: no anterior or posterior cervical adenopathy  Heart::  Regular rate and rhythm, no murmur  Lung: Clear to ascultation bilaterally, no wheezing, no rales, no rhonchi, no distress  Abdomen: Soft, nontender, no distention, no hepatosplenomegaly, bowel sounds normal, no guarding, no rebound, no peritoneal signs  Skin: no rashes, no lesions  Extremities: no edema, no cyanosis  Neuro: CN 2-12 intact, 5/5 muscle strength upper and lower extremity bilaterally, 2+ DTRs UE and LE bilaterally, normal gait  Peripheral pulses: 2+ pedal pulses bilaterally, good perfusion and color  Musculoskeletal: FROM, good strenth, no tenderness  Joint: normal appearance, no swelling, no warmth, no deformity in all joints    Assessment:       1. Well adult exam    2. Hyperlipidemia, unspecified hyperlipidemia type    3. Mixed headache    4. History of Rodriguez-en-Y gastric bypass    5. Vitamin B 12 deficiency    6. Vitamin D deficiency    7. Irregular periods        Plan:       Well adult exam  She is UTD on her labs, mammogram, pap, colon cancer screen and vaccines. She continues to refuse covid booster.    Hyperlipidemia, unspecified hyperlipidemia type  Improved LDL with weight loss. She is not on treatment and continue to monitor.    Mixed headache  She has mixed headache with migraine and tension. Referral to neurology headache clinic for evaluation.   -     Ambulatory referral/consult to Neurology; Future; Expected date: 12/09/2022    History of Rodriguez-en-Y gastric bypass  She continues to " struggle to keep her weight down and is doing well on metformin and ozempic.     Vitamin B 12 deficiency  Stable on a multivitamin. Continue to monitor annually.    Vitamin D deficiency  Continue supplementation.     Irregular periods  Not sure the etiology. If her periods do not come in the next month then will need a referral back to gyn.    Follow up in about 1 year (around 12/2/2023) for annual exam.

## 2022-12-05 ENCOUNTER — OFFICE VISIT (OUTPATIENT)
Dept: NEUROLOGY | Facility: CLINIC | Age: 47
End: 2022-12-05
Payer: COMMERCIAL

## 2022-12-05 VITALS
BODY MASS INDEX: 25.29 KG/M2 | RESPIRATION RATE: 17 BRPM | DIASTOLIC BLOOD PRESSURE: 80 MMHG | HEART RATE: 81 BPM | SYSTOLIC BLOOD PRESSURE: 114 MMHG | HEIGHT: 64 IN | WEIGHT: 148.13 LBS

## 2022-12-05 DIAGNOSIS — M79.18 MYOFASCIAL PAIN: ICD-10-CM

## 2022-12-05 DIAGNOSIS — R51.9 MIXED HEADACHE: ICD-10-CM

## 2022-12-05 DIAGNOSIS — G43.009 MIGRAINE WITHOUT AURA AND WITHOUT STATUS MIGRAINOSUS, NOT INTRACTABLE: Primary | ICD-10-CM

## 2022-12-05 DIAGNOSIS — G47.9 TROUBLE IN SLEEPING: ICD-10-CM

## 2022-12-05 PROCEDURE — 99204 OFFICE O/P NEW MOD 45 MIN: CPT | Mod: S$GLB,,, | Performed by: PHYSICIAN ASSISTANT

## 2022-12-05 PROCEDURE — 3044F PR MOST RECENT HEMOGLOBIN A1C LEVEL <7.0%: ICD-10-PCS | Mod: CPTII,S$GLB,, | Performed by: PHYSICIAN ASSISTANT

## 2022-12-05 PROCEDURE — 99204 PR OFFICE/OUTPT VISIT, NEW, LEVL IV, 45-59 MIN: ICD-10-PCS | Mod: S$GLB,,, | Performed by: PHYSICIAN ASSISTANT

## 2022-12-05 PROCEDURE — 1159F PR MEDICATION LIST DOCUMENTED IN MEDICAL RECORD: ICD-10-PCS | Mod: CPTII,S$GLB,, | Performed by: PHYSICIAN ASSISTANT

## 2022-12-05 PROCEDURE — 3008F PR BODY MASS INDEX (BMI) DOCUMENTED: ICD-10-PCS | Mod: CPTII,S$GLB,, | Performed by: PHYSICIAN ASSISTANT

## 2022-12-05 PROCEDURE — 3044F HG A1C LEVEL LT 7.0%: CPT | Mod: CPTII,S$GLB,, | Performed by: PHYSICIAN ASSISTANT

## 2022-12-05 PROCEDURE — 1160F PR REVIEW ALL MEDS BY PRESCRIBER/CLIN PHARMACIST DOCUMENTED: ICD-10-PCS | Mod: CPTII,S$GLB,, | Performed by: PHYSICIAN ASSISTANT

## 2022-12-05 PROCEDURE — 3079F PR MOST RECENT DIASTOLIC BLOOD PRESSURE 80-89 MM HG: ICD-10-PCS | Mod: CPTII,S$GLB,, | Performed by: PHYSICIAN ASSISTANT

## 2022-12-05 PROCEDURE — 99999 PR PBB SHADOW E&M-EST. PATIENT-LVL V: CPT | Mod: PBBFAC,,, | Performed by: PHYSICIAN ASSISTANT

## 2022-12-05 PROCEDURE — 3074F SYST BP LT 130 MM HG: CPT | Mod: CPTII,S$GLB,, | Performed by: PHYSICIAN ASSISTANT

## 2022-12-05 PROCEDURE — 3074F PR MOST RECENT SYSTOLIC BLOOD PRESSURE < 130 MM HG: ICD-10-PCS | Mod: CPTII,S$GLB,, | Performed by: PHYSICIAN ASSISTANT

## 2022-12-05 PROCEDURE — 1160F RVW MEDS BY RX/DR IN RCRD: CPT | Mod: CPTII,S$GLB,, | Performed by: PHYSICIAN ASSISTANT

## 2022-12-05 PROCEDURE — 1159F MED LIST DOCD IN RCRD: CPT | Mod: CPTII,S$GLB,, | Performed by: PHYSICIAN ASSISTANT

## 2022-12-05 PROCEDURE — 3008F BODY MASS INDEX DOCD: CPT | Mod: CPTII,S$GLB,, | Performed by: PHYSICIAN ASSISTANT

## 2022-12-05 PROCEDURE — 3079F DIAST BP 80-89 MM HG: CPT | Mod: CPTII,S$GLB,, | Performed by: PHYSICIAN ASSISTANT

## 2022-12-05 PROCEDURE — 99999 PR PBB SHADOW E&M-EST. PATIENT-LVL V: ICD-10-PCS | Mod: PBBFAC,,, | Performed by: PHYSICIAN ASSISTANT

## 2022-12-05 RX ORDER — HYDROXYZINE PAMOATE 50 MG/1
CAPSULE ORAL
Qty: 15 CAPSULE | Refills: 0 | Status: SHIPPED | OUTPATIENT
Start: 2022-12-05 | End: 2023-07-25 | Stop reason: SDUPTHER

## 2022-12-05 RX ORDER — RIZATRIPTAN BENZOATE 10 MG/1
TABLET, ORALLY DISINTEGRATING ORAL
Qty: 10 TABLET | Refills: 4 | Status: SHIPPED | OUTPATIENT
Start: 2022-12-05 | End: 2023-02-20 | Stop reason: SDUPTHER

## 2022-12-05 RX ORDER — AMITRIPTYLINE HYDROCHLORIDE 10 MG/1
TABLET, FILM COATED ORAL
Qty: 60 TABLET | Refills: 4 | Status: SHIPPED | OUTPATIENT
Start: 2022-12-05 | End: 2023-02-20 | Stop reason: SDUPTHER

## 2022-12-05 NOTE — LETTER
December 5, 2022    Rita Grant  [address]  [place] [address]         [facility]  [address]  [facility] [address]  Phone: [phone]  Fax: [phone] December 5, 2022     Patient: Rita Grant   YOB: 1975   Date of Visit: 12/5/2022       To Whom It May Concern:    It is my medical opinion that Rita Grant may return to full duty immediately with no restrictions. Please excuse any absence due to her appt with us today.     If you have any questions or concerns, please don't hesitate to call.    Regards,        Dwight Loyola PA-C      No

## 2022-12-05 NOTE — PROGRESS NOTES
[facility]  [address] [place] [address]  Phone:[phone]  Fax: [phone]   New Patient Consultation    Patient Name: Rita Grant  : 1975  MRN:  [id number]  Today: 2022   chief complaint: Headache    PCP: Sonja Hewitt DO.       Assessment:   Rita Grant is a 47 y.o. right handed female with a PMHx of: gastric bypass, TMJ, HA, and HLD   whom presents solo at the request of the PCP for HA. HA appear to be episodic migrainous with persistent tension HA. Myofascial discomfort (including TMJ) likely contributes. Trouble in sleep and possible REM rebound (from chronic benadryl use) likely contributes as well.       Review:    ICD-10-CM ICD-9-CM   1. Migraine without aura and without status migrainosus, not intractable  G43.009 346.10   2. Mixed headache  R51.9 784.0   3. Myofascial pain  M79.18 729.1   4. Trouble in sleeping  G47.9 780.50         Plan:   Discussed realistic goals of care with patient at length. Discussed medication options, need for lifestyle adjustment. Discussed treatment will take time. Goal will be to reduce frequency/intensity/quantity of HA, not to be completely HA free. Gave copy of [facility] triggers for migraine informational sheet (N.b., a standard I give to patients who come to seek my care in HA clinic, regardless if they have migraines or not) and discussed clinic's non narcotic policy re: HA. Patient voiced understanding and agreement.            -will have patient track HA, discussed earl for smart phone           -will have patient work on lifestyle           -if HA change in quality/nature, will get updated imaging study            -discussed potential for teratogenicity with treatment, if her family planning status should change, discussed I'd like her to let office know immediately. She voice agreement    For HA Prevention:  1 offered topamax vs elavil, chose elavil, discussed adv effects/dosing, can self  "titrate up to 20 mg Q2h before bed in coming week, all questions answered  2 PT        For HA :  Wrote maxalt, discussed adv effects/dosing, she agreed    To break up Headaches:  Offered steroids, states this causes "eye problems" for her, will hold off this medicine  Will try course of vistaril to break up the HA, discussed adv effects/dosing, no to drive with it/causes sedation-she agreed     Other:  Stop daily benadryl           All test results as well as any necessary instructions were reviewed and discussed with patient.    Review:  Orders Placed This Encounter    Ambulatory referral/consult to Physical/Occupational Therapy    hydrOXYzine pamoate (VISTARIL) 50 MG Cap    rizatriptan (MAXALT-MLT) 10 MG disintegrating tablet    amitriptyline (ELAVIL) 10 MG tablet         Patient to return to PCP/other specialists for all other problems  Patient to continue on all medications as Rx'd  A detailed AVS was provided to the patient with patient readback   RTO- 4-6 weeks to review HA log   The patient indicates understanding of these issues and agrees to the plan.    HPI:   Rita Grant is a 47 y.o.right handed, female with a PMHx of: gastric bypass, TMJ, HA, and HLD   whom presents solo at the request of the PCP for HA.     HA HPI:  Start:HA since teenager, "I would like to go over options to treat this HA"  History of trauma (no), History of CNS infection (no), History of Stroke (no)  Location:frontalis and behind the eyes, sometimes stabbing pain in the temples (for years when asked), sometimes will be in occiput  Severity: range: 4-8/10  Duration:4+ hours, longer if she didn't take medicine for bad ones   Frequency:12-15 HD/30 (2 of which migrainous)   How many HA types do you have (?):2 (migraines add nausea and photophobia/phonophobia)   Associated factors (bolded positive) WITH HA (or migraine): Nausea, vomiting, photophobia, phonophobia, tinnitus, scalp pain, vision loss, diplopia, " "scintillations, eye pain, jaw pain, weakness?    Tried:tylenol   Triggers (in bold): stress, lack of sleep (has been using benadryl for years to sleep. "I also have sinus issues, so it helps that too), too much caffeine, too little caffeine, weather change, seasonal change, strong odours, bright lights, sunlight, food, hormonal changes, TMJ   Last HA: yesterday   Positives in bold: Hx of Kidney Stones, asthma, GI bleed, osteoporosis, CAD/MI, CVA/TIA, DM    Imaging on file: no imaging on head   Therapies tried in past: (failures to be marked, if known---why did it fail?)  MgOx  Zofran  Percocet (in her 20s)       As aside a few weeks ago was studying and awoke on the table, unclear if she   "Passed out" or fell asleep. States at times some trouble in sleep. "I was getting a HA and when I woke up I was worried I had a stroke because I was confused and had a bad HA. I was also full of drool." Denies Hx of seizure, unclear if she was dehydrated/missing a meal/ over tired, denies incontinence/tongue biting, states unwitnessed, and no stroke symptoms either (e.g., facial droop, slurred speech, numbness/weakness on one side, etc).     Medication Reconciliation:   Current Outpatient Medications   Medication Sig Dispense Refill    biotin 10 mg Tab once daily.       diphenhydrAMINE (BENADRYL) 50 MG capsule Take 50 mg by mouth every 6 (six) hours as needed for Itching.      ergocalciferol, vitamin D2, (VITAMIN D ORAL) once daily.       INTRAROSA 6.5 mg Inst       ipratropium (ATROVENT) 42 mcg (0.06 %) nasal spray 2 sprays by Nasal route 3 (three) times daily. 30 minutes before meals 15 mL 2    magnesium 250 mg Tab once daily.       melatonin 10 mg Cap every evening.       metFORMIN (GLUCOPHAGE-XR) 500 MG ER 24hr tablet Take 1,000 mg by mouth.      progesterone (PROMETRIUM) 200 MG capsule Take 200 mg by mouth nightly.      semaglutide (OZEMPIC) 0.25 mg or 0.5 mg(2 mg/1.5 mL) pen injector Inject  0.25 mg subcutaneously weekly " for 2 weeks then increase to 0.5 mg weekly thereafter. 2 pen 6    TRANEXAMIC ACID ORAL       valACYclovir (VALTREX) 1000 MG tablet Take 1,000 mg by mouth 2 (two) times daily as needed.       No current facility-administered medications for this visit.     Review of patient's allergies indicates:   Allergen Reactions    Bactrim [sulfamethoxazole-trimethoprim] Swelling       PMHx:  Past Medical History:   Diagnosis Date    Anemia      Past Surgical History:   Procedure Laterality Date    BREAST BIOPSY       SECTION      x 3    GASTRIC BYPASS      Rodriguez n Y       Fhx:  Family History   Problem Relation Age of Onset    Lung cancer Paternal Grandfather         lung    Colon cancer Paternal Grandmother         colon    Heart disease Maternal Grandfather     Hypertension Father     Heart disease Father     Thyroid nodules Mother     Hypothyroidism Mother     Ovarian cancer Maternal Grandmother     Macular degeneration Neg Hx     Glaucoma Neg Hx     Diabetes Neg Hx        Shx:   Social History     Socioeconomic History    Marital status:    Occupational History    Occupation: nurse    Tobacco Use    Smoking status: Never    Smokeless tobacco: Never   Substance and Sexual Activity    Alcohol use: Yes     Comment: 2x year    Drug use: No    Sexual activity: Yes     Partners: Male     Social Determinants of Health     Financial Resource Strain: Low Risk     Difficulty of Paying Living Expenses: Not hard at all   Food Insecurity: No Food Insecurity    Worried About Running Out of Food in the Last Year: Never true    Ran Out of Food in the Last Year: Never true   Transportation Needs: No Transportation Needs    Lack of Transportation (Medical): No    Lack of Transportation (Non-Medical): No   Physical Activity: Sufficiently Active    Days of Exercise per Week: 5 days    Minutes of Exercise per Session: 30 min   Stress: No Stress Concern Present    Feeling of Stress : Only a little   Social Connections: Unknown  "   Frequency of Communication with Friends and Family: More than three times a week    Frequency of Social Gatherings with Friends and Family: Twice a week    Active Member of Clubs or Organizations: No    Attends Club or Organization Meetings: Never    Marital Status:    Housing Stability: High Risk    Unable to Pay for Housing in the Last Year: No    Number of Places Lived in the Last Year: 1    Unstable Housing in the Last Year: Yes           Labs:   Reviewed in chart     Imaging:   Reviewed in chart       Other testing:  Reviewed in chart     Note: I have independently reviewed any/all imaging/labs/tests and agree with the report (s) as documented.  Any discrepancies will be as noted/demarcated by free text.  STEVIE TAM 12/5/2022                     ROS:   Review Of Systems (questions asked, positive or additions in BOLD)  Gen: Weight change, fatigue/malaise, pyrexia   HEENT: Tinnitus, headache,  blurred vision, eye pain, diplopia, photophobia,  nose bleeds, congestion, sore throat, jaw pain, scalp pain, neck stiffness   Card: Palpitations, CP   Pulm: SOB, cough   Vas: Easy bruising, easy bleeding   GI: N/V/D/C, incontinence, hematemesis, hematochezia    : incontinence, hematuria   Endocrine: Temp intolerance, polyuria, polydipsia   M/S: Neck pain, myalgia, back pain, joint pain, falls    Neuro: PER HPI   PSY: Memory loss, confusion, depression, anxiety, trouble in sleep          EXAM:   /80 (BP Location: Left arm, Patient Position: Sitting, BP Method: Medium (Automatic))   Pulse 81   Resp 17   Ht 5' 4" (1.626 m)   Wt 67.2 kg (148 lb 2.4 oz)   Breastfeeding No   BMI 25.43 kg/m²    GEN:  NAD  HEENT: NC/AT, Frontalis was NTTP, temporalis was mildly TTP, vertex NTTP,  nares patent, dentition appropriate,  neck supple, trachea midline, Occiput and trapezius  TTP, sits with head forward posture     EXTREM:   no edema present.    NEURO:  Mental Status:  Awake, alert and appropriately oriented to " time, place, and person.  Normal attention and concentration.  Speech is fluent and appropriate language function (I.e., comprehension).     Cranial Nerves:      Pupils are equal and reactive to light.  Extraocular movements are intact and without nystagmus.  Visual fields are full to confrontation testing. Colour vision change not found.  Facial movement is symmetric.  Facial sensation is intact.  Hearing is normal. Uvula in midline. DROM of neck in all (6) directions, shoulder shrug symmetrical. Tongue in midline without fasiculation.     Motor:  RUE:appropriate against gravity and medium force as tested 5/5              LUE: appropriate against gravity and medium force as tested 5/5              RLE:appropriate against gravity and medium force as tested 5/5              LLE: appropriate against gravity and medium force as tested 5/5  Tremor/pronator drift not apparent.    finger extension strength was strong bilat     Sensory:  RUE  intact light touch, proprioception, and temperature  LUE intact light touch, proprioception, and temperature    RLE intact light touch  LLE intact light touch      DTR's:                                            R              L  biceps 2+ 2+         brachioradialis 2+ 2+   Knee jerk 2+ 2+        Coordination:  FTN-WNL.      Gait and Stance: Normal manner of stance and gait function testing. Romberg was negative .          This document has been electronically signed by Mr. Dwight Loyola MPA, PA-C on 12/5/2022, I have personally typed this message using the EMR.       Dr Lorenzo MD  was available during today's visit.     Personal Protective Equipment:    Personal Protective Equipment was used during this encounter including:  mask-surgical and non latex gloves.          CC: Sonja Hewitt DO

## 2022-12-05 NOTE — PATIENT INSTRUCTIONS
"     Stop benadryl  Consider headache buddy/migraine buddy in android market to track the headaches     To reduce headaches:  1) PT ordered  2) elavil (amitriptyline)  1 pill approx 2 hours before bed every night, if after a week, no benefits/no side effects, move up to 2 pills    To "factory reset"  Try the vistaril (hydroxyzine) 1 pill 3x a day for 2 days, rest left over every 8 hours as needed for bad headache, can cause sedation/ no driving    To "abort" headaches:  Try the maxalt (rizatriptan) 1 melt at onset headache, second melt 2 hours later if needed, no more than 2 melts day/ 3days use in week   "

## 2022-12-14 ENCOUNTER — CLINICAL SUPPORT (OUTPATIENT)
Dept: REHABILITATION | Facility: HOSPITAL | Age: 47
End: 2022-12-14
Attending: PHYSICIAN ASSISTANT
Payer: COMMERCIAL

## 2022-12-14 DIAGNOSIS — M79.18 MYOFASCIAL PAIN: ICD-10-CM

## 2022-12-14 DIAGNOSIS — R29.3 POOR POSTURE: ICD-10-CM

## 2022-12-14 DIAGNOSIS — M54.2 CERVICALGIA: ICD-10-CM

## 2022-12-14 PROCEDURE — 97161 PT EVAL LOW COMPLEX 20 MIN: CPT | Mod: PO

## 2022-12-14 PROCEDURE — 97110 THERAPEUTIC EXERCISES: CPT | Mod: PO

## 2022-12-14 PROCEDURE — 97140 MANUAL THERAPY 1/> REGIONS: CPT | Mod: PO

## 2022-12-14 NOTE — PLAN OF CARE
[facility]  Physical Therapy Initial Evaluation    Name: Rita Grant  Clinic Number: [id number]    Therapy Diagnosis:   Encounter Diagnoses   Name Primary?    Myofascial pain     Cervicalgia     Poor posture      Physician: Dwight Loyola PA-C    Physician Orders: PT Eval and Treat   Medical Diagnosis from Referral: M79.18 (ICD-10-CM) - Myofascial pain   Evaluation Date: 2022  Authorization Period Expiration: 2022   Plan of Care Expiration: 2023  Visit # / Visits authorized:     Time In: 4:45  Time Out: 5:45  Total Billable Time: 25 minutes    Precautions: Standard    Subjective   Date of onset: chronic  History of current condition - Rita reports: Pt reports that she has had headaches her entire left. She has attributed them to sinuses, TMJ, hormones, etc. She does have some light sensitivity when she has headaches. She can have headaches every day. She started some new medicine last Monday. She has not noticed much improvement with these medicines. She tried one to get rid of a severe headache and it made her very tired, so she cannot take that while she works. She does feel that stress and the light of a computer can make her headaches worse.   She did have some neck pain last week, she felt she slept wrong. However, she feels that the pain is just about resolved now.        Medical History:   Past Medical History:   Diagnosis Date    Anemia     Headache        Surgical History:   Rita Grant  has a past surgical history that includes Gastric bypass ();  section; and Breast biopsy.    Medications:   Rita has a current medication list which includes the following prescription(s): amitriptyline, biotin, diphenhydramine, ergocalciferol (vitamin d2), hydroxyzine pamoate, intrarosa, ipratropium, magnesium, melatonin, metformin, progesterone, rizatriptan, semaglutide, tranexamic acid, and valacyclovir.    Allergies:   Review of patient's  allergies indicates:   Allergen Reactions    Bactrim [sulfamethoxazole-trimethoprim] Swelling        Imaging, none    Prior Therapy: Yes, for her back  Social History: Pt lives with their family  Occupation: LPN   Prior Level of Function: Independent in all ADLs  Current Level of Function: Remains     Pain:  Current 0/10, worst 7/10, best 0/10   Location: behind the head, temples, behind the eyes  Description: Throbbing and stabbing, constant pain in the eyes  Aggravating Factors: stress, posture, light  Easing Factors:  Tylenol    Pts goals: Reduce headaches    Objective     Observation: Sits with rounded shoulders and forward head posture.     Cervical Range of Motion:    % Limitation Pain   Flexion 0 No     Extension 0 No     Right Rotation 0 No     Left Rotation 0 No     Right Side Bending 0 No   Left Side Bending 0 No - tight      Shoulder Range of Motion:   Shoulder Left Right   Flexion 180 180   Abduction 180 180     Upper Extremity Strength  Left UE  Right UE    Shoulder flexion: 5/5 Shoulder flexion: 5/5   Shoulder Abduction: 5/5 Shoulder abduction: 5/5   Elbow flexion: 5/5 Elbow flexion: 5/5   Elbow extension: 5/5 Elbow extension: 5/5   Wrist extension: 5/5 Wrist extension: 5/5     Special Tests:  Distraction (+)   Compression (-)   Sharp-Martin (-)   VA test (-)   Lateral Flexion Alar Ligament (-)       Joint Mobility: Normal to hypermobility throughout the cervical spine    Palpation: No TTP      Sensation: Intact to light touch       CMS Impairment/Limitation/Restriction for FOTO NECK Survey    Therapist reviewed FOTO scores for Rita Grant on 12/14/2022.   FOTO documents entered into EPIC - see Media section.    Limitation Score: 4%  Category: Mobility       TREATMENT   Treatment Time In: 5:20  Treatment Time Out: 5:45  Total Treatment time separate from Evaluation: 25 minutes    Rita received therapeutic exercises to develop strength, ROM, flexibility, and posture for 10 minutes  including:  Seated chin tucks, 1x10 w/ 3s hold  Thoracic ext, 1x10  Prone chin tucks, 1x10 w/ 3s hold  Prone Ws w/ neck ext, 1x10 w/ 3s hold  Standing rows w/ green band, 1x10  No moneys w/ green band, 1x10    Rita received the following manual therapy techniques: for 15 minutes, including:    Discussed the purpose, mechanism, and indications for dry needling with Rita . Patient was cleared of all precautions and contraindications and pt signed written consent and gave verbal consent to dry needling Rx today.   Palpation used to determine dry needling sites. Pt rec'd dry needling in prone position to B upper traps and suboccipitals with 0.25x0.40 mm needles w/ e-stim.  Pt tolerated treatment well and was not in any distress at the completion of treatment.         Home Exercises and Patient Education Provided    Education provided:   - Role of PT, PT POC, PT diagnosis, PT prognosis, HEP    Written Home Exercises Provided: yes.  Exercises were reviewed and Rita was able to demonstrate them prior to the end of the session.  Rita demonstrated good  understanding of the education provided.     See EMR under Patient Instructions for exercises provided 12/14/2022.    Assessment   Rita is a 47 y.o. female referred to outpatient Physical Therapy with a medical diagnosis of M79.18 (ICD-10-CM) - Myofascial pain. Physical exam is consistent with cervical dysfunction. Primary impairments include AROM, PROM, joint mobility, strength, soft tissue restrictions, and pain which limits functional mobility. This pt is an excellent candidate for skilled PT tx and stands to benefit from a combination of manual therapy including joint mobilizations with trigger point/myofacscial release, therapeutic exercise to establish core/joint stability, neuromuscular re-education, dry needling, and modalities Prn. The pt has been educated on their dx/POC and consents to further PT tx.    Pt prognosis is Good.   Pt will benefit from  skilled outpatient Physical Therapy to address the deficits stated above and in the chart below, provide pt/family education, and to maximize pt's level of independence.     Plan of care discussed with patient: Yes  Pt's spiritual, cultural and educational needs considered and patient is agreeable to the plan of care and goals as stated below:     Anticipated Barriers for therapy: None    Medical Necessity is demonstrated by the following  History  Co-morbidities and personal factors that may impact the plan of care Co-morbidities:   As noted above    Personal Factors:   no deficits     low   Examination  Body Structures and Functions, activity limitations and participation restrictions that may impact the plan of care Body Regions:   head  neck  upper extremities    Body Systems:    strength  motor control    Participation Restrictions:   None    Activity limitations:   Learning and applying knowledge  no deficits    General Tasks and Commands  no deficits    Communication  no deficits    Mobility  no deficits    Self care  no deficits    Domestic Life  no deficits    Interactions/Relationships  no deficits    Life Areas  employment    Community and Social Life  recreation and leisure         low   Clinical Presentation stable and uncomplicated low   Decision Making/ Complexity Score: low     Goals:  Short Term Goals (4 Weeks):   - Pt will demonstrate compliance with initial home exercise program as prescribed by physical therapist to improve independence with management of condition.  - Pt to report cervical pain of <4/10 at worst to improve tolerance to ADLs and work.  - Pt to report reduced frequency of headaches to 3x per week.     Long Term Goals (8 Weeks):   - Pt to report cervical pain of <2/10 at worst to improve tolerance to ADLs and work.  - Pt to report reduced frequency of headaches to 1x per week.     Plan    Plan of care Certification: 12/14/2022 to 2/18/2023.    Outpatient Physical Therapy 2 times  weekly for 8 weeks to include the following interventions: Cervical/Lumbar Traction, Electrical Stimulation  , Manual Therapy, Moist Heat/ Ice, Neuromuscular Re-ed, Patient Education, Therapeutic Activities, and Therapeutic Exercise.     Theron Irizarry, PT

## 2022-12-19 ENCOUNTER — CLINICAL SUPPORT (OUTPATIENT)
Dept: REHABILITATION | Facility: HOSPITAL | Age: 47
End: 2022-12-19
Attending: PHYSICIAN ASSISTANT
Payer: COMMERCIAL

## 2022-12-19 DIAGNOSIS — R29.3 POOR POSTURE: ICD-10-CM

## 2022-12-19 DIAGNOSIS — M54.2 CERVICALGIA: Primary | ICD-10-CM

## 2022-12-19 PROCEDURE — 97140 MANUAL THERAPY 1/> REGIONS: CPT | Mod: PO

## 2022-12-19 NOTE — PROGRESS NOTES
Physical Therapy Daily Treatment Note     Name: Rita Grant  Clinic Number: [id number]    Therapy Diagnosis:   Encounter Diagnoses   Name Primary?    Cervicalgia Yes    Poor posture      Physician: Dwight Loyola PA-C    Visit Date: 12/19/2022  Physician Orders: PT Eval and Treat   Medical Diagnosis from Referral: M79.18 (ICD-10-CM) - Myofascial pain   Evaluation Date: 12/14/2022  Authorization Period Expiration: 12/31/2022   Plan of Care Expiration: 2/18/2023  Visit # / Visits authorized: 1 / 12    Re-Assessment due: 1/14/2022    Time In: 4:45  Time Out: 5:10  Total Billable Time: 25 minutes    Precautions: Standard    Subjective     Pt reports: She was pretty sore after dry needling last session, but she did have improvement in symptoms after that. She has not had a headache since dry needling. She felt one headache coming on, but it went away before she took any medicine. .  She was compliant with home exercise program.  Response to previous treatment: Sore, reduced headaches  Functional change: Too soon    Pain: 0/10  Location: neck     Objective     Rita received therapeutic exercises to develop ROM, flexibility, and posture for 00 minutes including:  Seated chin tucks, 1x10 w/ 3s hold  Thoracic ext, 1x10  Prone chin tucks, 1x10 w/ 3s hold  Prone Ws w/ neck ext, 1x10 w/ 3s hold  Standing rows w/ green band, 1x10  No moneys w/ green band, 1x10    Rita received the following manual therapy techniques: were applied for 25 minutes, including:  Discussed the purpose, mechanism, and indications for dry needling with Rita . Patient was cleared of all precautions and contraindications and pt signed written consent and gave verbal consent to dry needling Rx today.   Palpation used to determine dry needling sites. Pt rec'd dry needling in prone position to B upper traps and suboccipitals with 0.25x0.40 mm needles w/ e-stim.  Pt tolerated treatment well and was not in any distress at the completion of  treatment.      Rita participated in neuromuscular re-education activities to improve: Coordination, Proprioception, and Posture for 00 minutes. The following activities were included:        Home Exercises Provided and Patient Education Provided     Education provided:   - Continue HEP    Written Home Exercises Provided: Patient instructed to cont prior HEP.  Exercises were reviewed and Rita was able to demonstrate them prior to the end of the session.  Rita demonstrated good  understanding of the education provided.     See EMR under Patient Instructions for exercises provided 12/19/2022.    Assessment     Pt tolerated tx well, no exacerbation of symptoms. Noted increased tenderness in the L suboccipitals. Will assess response and continue with dry needling as indicated.   Rita Is progressing well towards her goals.   Pt prognosis is Good.     Pt will continue to benefit from skilled outpatient physical therapy to address the deficits listed in the problem list box on initial evaluation, provide pt/family education and to maximize pt's level of independence in the home and community environment.     Pt's spiritual, cultural and educational needs considered and pt agreeable to plan of care and goals.    Anticipated barriers to physical therapy: None    Goals:   Short Term Goals (4 Weeks):   - Pt will demonstrate compliance with initial home exercise program as prescribed by physical therapist to improve independence with management of condition.  - Pt to report cervical pain of <4/10 at worst to improve tolerance to ADLs and work.  - Pt to report reduced frequency of headaches to 3x per week.      Long Term Goals (8 Weeks):   - Pt to report cervical pain of <2/10 at worst to improve tolerance to ADLs and work.  - Pt to report reduced frequency of headaches to 1x per week.     Plan     Plan of care Certification: 12/14/2022 to 2/18/2023.     Outpatient Physical Therapy 2 times weekly for 8 weeks to  include the following interventions: Cervical/Lumbar Traction, Electrical Stimulation  , Manual Therapy, Moist Heat/ Ice, Neuromuscular Re-ed, Patient Education, Therapeutic Activities, and Therapeutic Exercise.     Theron Irizarry, PT

## 2022-12-21 ENCOUNTER — CLINICAL SUPPORT (OUTPATIENT)
Dept: REHABILITATION | Facility: HOSPITAL | Age: 47
End: 2022-12-21
Attending: PHYSICIAN ASSISTANT
Payer: COMMERCIAL

## 2022-12-21 DIAGNOSIS — M54.2 CERVICALGIA: Primary | ICD-10-CM

## 2022-12-21 DIAGNOSIS — R29.3 POOR POSTURE: ICD-10-CM

## 2022-12-21 PROCEDURE — 97140 MANUAL THERAPY 1/> REGIONS: CPT | Mod: PO

## 2022-12-21 NOTE — PROGRESS NOTES
Physical Therapy Daily Treatment Note     Name: Rita Grant  Clinic Number: [id number]    Therapy Diagnosis:   Encounter Diagnoses   Name Primary?    Cervicalgia Yes    Poor posture      Physician: Dwight Loyola PA-C    Visit Date: 12/21/2022  Physician Orders: PT Eval and Treat   Medical Diagnosis from Referral: M79.18 (ICD-10-CM) - Myofascial pain   Evaluation Date: 12/14/2022  Authorization Period Expiration: 12/31/2022   Plan of Care Expiration: 2/18/2023  Visit # / Visits authorized: 2 / 12    Re-Assessment due: 1/14/2022    Time In: 10:00  Time Out: 10:25  Total Billable Time: 25 minutes    Precautions: Standard    Subjective     Pt reports: She is doing well. She was not sore after last session. Reports this is the longest she has been without a headache in a long time.   She was compliant with home exercise program.  Response to previous treatment: Sore, reduced headaches  Functional change: Too soon      Pain: 0/10  Location: neck     Objective     Rita received therapeutic exercises to develop ROM, flexibility, and posture for 00 minutes including:  Seated chin tucks, 1x10 w/ 3s hold  Thoracic ext, 1x10  Prone chin tucks, 1x10 w/ 3s hold  Prone Ws w/ neck ext, 1x10 w/ 3s hold  Standing rows w/ green band, 1x10  No moneys w/ green band, 1x10    Rita received the following manual therapy techniques: were applied for 25 minutes, including:  Discussed the purpose, mechanism, and indications for dry needling with Rita . Patient was cleared of all precautions and contraindications and pt signed written consent and gave verbal consent to dry needling Rx today.   Palpation used to determine dry needling sites. Pt rec'd dry needling in prone position to B upper traps and suboccipitals with 0.25x0.40 mm needles w/ e-stim.  Pt tolerated treatment well and was not in any distress at the completion of treatment.      Rita participated in neuromuscular re-education activities to improve:  Coordination, Proprioception, and Posture for 00 minutes. The following activities were included:        Home Exercises Provided and Patient Education Provided     Education provided:   - Continue HEP    Written Home Exercises Provided: Patient instructed to cont prior HEP.  Exercises were reviewed and Rita was able to demonstrate them prior to the end of the session.  Rita demonstrated good  understanding of the education provided.     See EMR under Patient Instructions for exercises provided 12/19/2022.    Assessment     Pt tolerated tx well, no exacerbation of symptoms. Will assess response and continue with dry needling as indicated.   Rita Is progressing well towards her goals.   Pt prognosis is Good.     Pt will continue to benefit from skilled outpatient physical therapy to address the deficits listed in the problem list box on initial evaluation, provide pt/family education and to maximize pt's level of independence in the home and community environment.     Pt's spiritual, cultural and educational needs considered and pt agreeable to plan of care and goals.    Anticipated barriers to physical therapy: None    Goals:   Short Term Goals (4 Weeks):   - Pt will demonstrate compliance with initial home exercise program as prescribed by physical therapist to improve independence with management of condition.  - Pt to report cervical pain of <4/10 at worst to improve tolerance to ADLs and work.  - Pt to report reduced frequency of headaches to 3x per week.      Long Term Goals (8 Weeks):   - Pt to report cervical pain of <2/10 at worst to improve tolerance to ADLs and work.  - Pt to report reduced frequency of headaches to 1x per week.     Plan     Plan of care Certification: 12/14/2022 to 2/18/2023.     Outpatient Physical Therapy 2 times weekly for 8 weeks to include the following interventions: Cervical/Lumbar Traction, Electrical Stimulation  , Manual Therapy, Moist Heat/ Ice, Neuromuscular Re-ed,  Patient Education, Therapeutic Activities, and Therapeutic Exercise.     Theron Irizarry, PT

## 2022-12-30 ENCOUNTER — CLINICAL SUPPORT (OUTPATIENT)
Dept: REHABILITATION | Facility: HOSPITAL | Age: 47
End: 2022-12-30
Attending: PHYSICIAN ASSISTANT
Payer: COMMERCIAL

## 2022-12-30 DIAGNOSIS — R29.3 POOR POSTURE: ICD-10-CM

## 2022-12-30 DIAGNOSIS — M54.2 CERVICALGIA: Primary | ICD-10-CM

## 2022-12-30 PROCEDURE — 97140 MANUAL THERAPY 1/> REGIONS: CPT | Mod: PO

## 2022-12-30 NOTE — PROGRESS NOTES
Physical Therapy Daily Treatment Note     Name: Rita Grant  Clinic Number: [id number]    Therapy Diagnosis:   Encounter Diagnoses   Name Primary?    Cervicalgia Yes    Poor posture      Physician: Dwight Loyola PA-C    Visit Date: 12/30/2022  Physician Orders: PT Eval and Treat   Medical Diagnosis from Referral: M79.18 (ICD-10-CM) - Myofascial pain   Evaluation Date: 12/14/2022  Authorization Period Expiration: 12/31/2022   Plan of Care Expiration: 2/18/2023  Visit # / Visits authorized: 3 / 12    Re-Assessment due: 1/14/2022    Time In: 10:40  Time Out: 11:05  Total Billable Time: 25 minutes    Precautions: Standard    Subjective     Pt reports: She is still doing well. She has not been having any headaches since starting PT. She does have some stiffness in the right side of her neck and shoulder today. She believes this may be related to hurting her shoulder at work a few days ago.   She was compliant with home exercise program.  Response to previous treatment: Sore, reduced headaches  Functional change: Too soon      Pain: 0/10  Location: neck     Objective     Rita received therapeutic exercises to develop ROM, flexibility, and posture for 00 minutes including:  Seated chin tucks, 1x10 w/ 3s hold  Thoracic ext, 1x10  Prone chin tucks, 1x10 w/ 3s hold  Prone Ws w/ neck ext, 1x10 w/ 3s hold  Standing rows w/ green band, 1x10  No moneys w/ green band, 1x10    Rita received the following manual therapy techniques: were applied for 25 minutes, including:  Discussed the purpose, mechanism, and indications for dry needling with Rita . Patient was cleared of all precautions and contraindications and pt signed written consent and gave verbal consent to dry needling Rx today.   Palpation used to determine dry needling sites. Pt rec'd dry needling in prone position to B upper traps and suboccipitals with 0.25x0.40 mm needles w/ e-stim.  Pt tolerated treatment well and was not in any distress at the  completion of treatment.      Rita participated in neuromuscular re-education activities to improve: Coordination, Proprioception, and Posture for 00 minutes. The following activities were included:        Home Exercises Provided and Patient Education Provided     Education provided:   - Continue HEP    Written Home Exercises Provided: Patient instructed to cont prior HEP.  Exercises were reviewed and Rita was able to demonstrate them prior to the end of the session.  Rita demonstrated good  understanding of the education provided.     See EMR under Patient Instructions for exercises provided 12/19/2022.    Assessment     Pt tolerated tx well, no exacerbation of symptoms. Will assess response and continue with dry needling as indicated.   Rita Is progressing well towards her goals.   Pt prognosis is Good.     Pt will continue to benefit from skilled outpatient physical therapy to address the deficits listed in the problem list box on initial evaluation, provide pt/family education and to maximize pt's level of independence in the home and community environment.     Pt's spiritual, cultural and educational needs considered and pt agreeable to plan of care and goals.    Anticipated barriers to physical therapy: None    Goals:   Short Term Goals (4 Weeks):   - Pt will demonstrate compliance with initial home exercise program as prescribed by physical therapist to improve independence with management of condition.  - Pt to report cervical pain of <4/10 at worst to improve tolerance to ADLs and work.  - Pt to report reduced frequency of headaches to 3x per week.      Long Term Goals (8 Weeks):   - Pt to report cervical pain of <2/10 at worst to improve tolerance to ADLs and work.  - Pt to report reduced frequency of headaches to 1x per week.     Plan     Plan of care Certification: 12/14/2022 to 2/18/2023.     Outpatient Physical Therapy 2 times weekly for 8 weeks to include the following interventions:  Cervical/Lumbar Traction, Electrical Stimulation  , Manual Therapy, Moist Heat/ Ice, Neuromuscular Re-ed, Patient Education, Therapeutic Activities, and Therapeutic Exercise.     Theron Irizarry, PT

## 2023-01-04 ENCOUNTER — CLINICAL SUPPORT (OUTPATIENT)
Dept: REHABILITATION | Facility: HOSPITAL | Age: 48
End: 2023-01-04
Attending: PHYSICIAN ASSISTANT
Payer: COMMERCIAL

## 2023-01-04 DIAGNOSIS — M54.2 CERVICALGIA: Primary | ICD-10-CM

## 2023-01-04 DIAGNOSIS — R29.3 POOR POSTURE: ICD-10-CM

## 2023-01-04 PROCEDURE — 97140 MANUAL THERAPY 1/> REGIONS: CPT | Mod: PO

## 2023-01-04 NOTE — PROGRESS NOTES
Physical Therapy Daily Treatment Note     Name: Rita Grant  Clinic Number: [id number]    Therapy Diagnosis:   Encounter Diagnoses   Name Primary?    Cervicalgia Yes    Poor posture      Physician: Dwight Loyola PA-C    Visit Date: 1/4/2023  Physician Orders: PT Eval and Treat   Medical Diagnosis from Referral: M79.18 (ICD-10-CM) - Myofascial pain   Evaluation Date: 12/14/2022   Authorization Period Expiration: 12/31/2022   Plan of Care Expiration: 2/18/2023  Visit # / Visits authorized: 4 / 12    Re-Assessment due: 1/14/2023    Time In: 3:10  Time Out: 3:38  Total Billable Time: 28 minutes    Precautions: Standard    Subjective     Pt reports: She has continued to do well overall. She has only had 1 headache since last session. She feels she is able to manage her pain well with her new medications and is agreeable to decreasing the frequency of treatment at this time.   She was compliant with home exercise program.  Response to previous treatment: Sore, reduced headaches  Functional change: Too soon      Pain: 0/10  Location: neck     Objective     Rita received therapeutic exercises to develop ROM, flexibility, and posture for 00 minutes including:  Seated chin tucks, 1x10 w/ 3s hold  Thoracic ext, 1x10  Prone chin tucks, 1x10 w/ 3s hold  Prone Ws w/ neck ext, 1x10 w/ 3s hold  Standing rows w/ green band, 1x10  No moneys w/ green band, 1x10       Rita received the following manual therapy techniques: were applied for 25 minutes, including:  Discussed the purpose, mechanism, and indications for dry needling with Rita . Patient was cleared of all precautions and contraindications and pt signed written consent and gave verbal consent to dry needling Rx today.   Palpation used to determine dry needling sites. Pt rec'd dry needling in prone position to B upper traps and suboccipitals with 0.25x0.40 mm needles w/ e-stim.  Pt tolerated treatment well and was not in any distress at the completion of  treatment.      Rita participated in neuromuscular re-education activities to improve: Coordination, Proprioception, and Posture for 00 minutes. The following activities were included:        Home Exercises Provided and Patient Education Provided     Education provided:   - Continue HEP    Written Home Exercises Provided: Patient instructed to cont prior HEP.  Exercises were reviewed and Rita was able to demonstrate them prior to the end of the session.  Rita demonstrated good  understanding of the education provided.     See EMR under Patient Instructions for exercises provided 12/19/2022.    Assessment     Pt tolerated tx well, no exacerbation of symptoms. Will assess response and continue with dry needling as indicated.   Rita Is progressing well towards her goals.   Pt prognosis is Good.     Pt will continue to benefit from skilled outpatient physical therapy to address the deficits listed in the problem list box on initial evaluation, provide pt/family education and to maximize pt's level of independence in the home and community environment.     Pt's spiritual, cultural and educational needs considered and pt agreeable to plan of care and goals.    Anticipated barriers to physical therapy: None    Goals:   Short Term Goals (4 Weeks):   - Pt will demonstrate compliance with initial home exercise program as prescribed by physical therapist to improve independence with management of condition.  - Pt to report cervical pain of <4/10 at worst to improve tolerance to ADLs and work.  - Pt to report reduced frequency of headaches to 3x per week.      Long Term Goals (8 Weeks):   - Pt to report cervical pain of <2/10 at worst to improve tolerance to ADLs and work.  - Pt to report reduced frequency of headaches to 1x per week.     Plan     Plan of care Certification: 12/14/2022 to 2/18/2023.     Outpatient Physical Therapy 2 times weekly for 8 weeks to include the following interventions: Cervical/Lumbar  Traction, Electrical Stimulation  , Manual Therapy, Moist Heat/ Ice, Neuromuscular Re-ed, Patient Education, Therapeutic Activities, and Therapeutic Exercise.     Theron Irizarry, PT

## 2023-01-18 ENCOUNTER — PATIENT MESSAGE (OUTPATIENT)
Dept: ADMINISTRATIVE | Facility: HOSPITAL | Age: 48
End: 2023-01-18
Payer: COMMERCIAL

## 2023-01-18 DIAGNOSIS — Z12.11 SCREEN FOR COLON CANCER: Primary | ICD-10-CM

## 2023-01-19 ENCOUNTER — PATIENT OUTREACH (OUTPATIENT)
Dept: ADMINISTRATIVE | Facility: HOSPITAL | Age: 48
End: 2023-01-19
Payer: COMMERCIAL

## 2023-01-19 ENCOUNTER — PATIENT MESSAGE (OUTPATIENT)
Dept: ADMINISTRATIVE | Facility: HOSPITAL | Age: 48
End: 2023-01-19
Payer: COMMERCIAL

## 2023-01-19 NOTE — TELEPHONE ENCOUNTER
WILL MAIL FIT KIT.  INFORMED PATIENT THE FIT KIT PROCESS INSTRUCTIONS.      SEE PORTAL MESSAGE    FitKit was given to patient on 1/19/2023 11:47 AM

## 2023-02-20 ENCOUNTER — OFFICE VISIT (OUTPATIENT)
Dept: NEUROLOGY | Facility: CLINIC | Age: 48
End: 2023-02-20
Payer: COMMERCIAL

## 2023-02-20 VITALS
HEART RATE: 86 BPM | SYSTOLIC BLOOD PRESSURE: 109 MMHG | WEIGHT: 149.94 LBS | RESPIRATION RATE: 20 BRPM | DIASTOLIC BLOOD PRESSURE: 71 MMHG | BODY MASS INDEX: 25.73 KG/M2

## 2023-02-20 DIAGNOSIS — G43.009 MIGRAINE WITHOUT AURA AND WITHOUT STATUS MIGRAINOSUS, NOT INTRACTABLE: Primary | ICD-10-CM

## 2023-02-20 DIAGNOSIS — G44.209 TENSION HEADACHE: ICD-10-CM

## 2023-02-20 PROCEDURE — 99213 PR OFFICE/OUTPT VISIT, EST, LEVL III, 20-29 MIN: ICD-10-PCS | Mod: S$GLB,,, | Performed by: PHYSICIAN ASSISTANT

## 2023-02-20 PROCEDURE — 1159F MED LIST DOCD IN RCRD: CPT | Mod: CPTII,S$GLB,, | Performed by: PHYSICIAN ASSISTANT

## 2023-02-20 PROCEDURE — 1160F PR REVIEW ALL MEDS BY PRESCRIBER/CLIN PHARMACIST DOCUMENTED: ICD-10-PCS | Mod: CPTII,S$GLB,, | Performed by: PHYSICIAN ASSISTANT

## 2023-02-20 PROCEDURE — 99999 PR PBB SHADOW E&M-EST. PATIENT-LVL IV: CPT | Mod: PBBFAC,,, | Performed by: PHYSICIAN ASSISTANT

## 2023-02-20 PROCEDURE — 99999 PR PBB SHADOW E&M-EST. PATIENT-LVL IV: ICD-10-PCS | Mod: PBBFAC,,, | Performed by: PHYSICIAN ASSISTANT

## 2023-02-20 PROCEDURE — 1160F RVW MEDS BY RX/DR IN RCRD: CPT | Mod: CPTII,S$GLB,, | Performed by: PHYSICIAN ASSISTANT

## 2023-02-20 PROCEDURE — 3078F DIAST BP <80 MM HG: CPT | Mod: CPTII,S$GLB,, | Performed by: PHYSICIAN ASSISTANT

## 2023-02-20 PROCEDURE — 1159F PR MEDICATION LIST DOCUMENTED IN MEDICAL RECORD: ICD-10-PCS | Mod: CPTII,S$GLB,, | Performed by: PHYSICIAN ASSISTANT

## 2023-02-20 PROCEDURE — 3074F SYST BP LT 130 MM HG: CPT | Mod: CPTII,S$GLB,, | Performed by: PHYSICIAN ASSISTANT

## 2023-02-20 PROCEDURE — 99213 OFFICE O/P EST LOW 20 MIN: CPT | Mod: S$GLB,,, | Performed by: PHYSICIAN ASSISTANT

## 2023-02-20 PROCEDURE — 3008F PR BODY MASS INDEX (BMI) DOCUMENTED: ICD-10-PCS | Mod: CPTII,S$GLB,, | Performed by: PHYSICIAN ASSISTANT

## 2023-02-20 PROCEDURE — 3074F PR MOST RECENT SYSTOLIC BLOOD PRESSURE < 130 MM HG: ICD-10-PCS | Mod: CPTII,S$GLB,, | Performed by: PHYSICIAN ASSISTANT

## 2023-02-20 PROCEDURE — 3078F PR MOST RECENT DIASTOLIC BLOOD PRESSURE < 80 MM HG: ICD-10-PCS | Mod: CPTII,S$GLB,, | Performed by: PHYSICIAN ASSISTANT

## 2023-02-20 PROCEDURE — 3008F BODY MASS INDEX DOCD: CPT | Mod: CPTII,S$GLB,, | Performed by: PHYSICIAN ASSISTANT

## 2023-02-20 RX ORDER — RIZATRIPTAN BENZOATE 10 MG/1
TABLET, ORALLY DISINTEGRATING ORAL
Qty: 10 TABLET | Refills: 6 | Status: SHIPPED | OUTPATIENT
Start: 2023-02-20 | End: 2023-07-25 | Stop reason: SDUPTHER

## 2023-02-20 RX ORDER — AMITRIPTYLINE HYDROCHLORIDE 10 MG/1
TABLET, FILM COATED ORAL
Qty: 60 TABLET | Refills: 6 | Status: SHIPPED | OUTPATIENT
Start: 2023-02-20 | End: 2023-05-22 | Stop reason: ALTCHOICE

## 2023-02-20 NOTE — PROGRESS NOTES
[facility]  [address] [facility] [address]  [place] [address]  Phone:[phone]  Fax: [phone]  Follow up visit     Patient Name: Rita Grant  : 1975  MRN:  [id number]  Today: 2023   LOV: 2022  chief complaint: Headache      PCP: Sonja Hewitt DO.       Assessment:   Rita Grant is a 47 y.o. right handed female with a PMHx of: gastric bypass, TMJ, HA, and HLD   whom presents solo in follow up. Appears to have episodic tension and migrainous HA. Better since starting elavil.  As aside slight tremor LUE. Non PD appearing. Will watch.          Review:    ICD-10-CM ICD-9-CM   1. Migraine without aura and without status migrainosus, not intractable  G43.009 346.10   2. Tension headache  G44.209 307.81           Plan:               -if HA change in quality/nature, will get updated imaging study          For HA Prevention:  Continue elavil at 20 mg Q2h before bed        For HA :  Has maxalt     To break up Headaches:  Has course of vistaril still available if the HA should return               All test results as well as any necessary instructions were reviewed and discussed with patient.    Review:  Orders Placed This Encounter    amitriptyline (ELAVIL) 10 MG tablet    rizatriptan (MAXALT-MLT) 10 MG disintegrating tablet           Patient to return to PCP/other specialists for all other problems  Patient to continue on all medications as Rx'd   Full office note available for her reference online in secured patient portal   RTO- 4 months, she states she will schedule online   The patient indicates understanding of these issues and agrees to the plan.    HPI:   Rita Grant is a 47 y.o.right handed, female with a PMHx of: gastric bypass, TMJ, HA, and HLD   whom presents solo in follow up for HA.     At last visit: started elavil, maxalt written along with a course of vistaril to break up her HA. PT was also ordered.   HA triggered by  "stress and certain lighting  Elavil helping without side effects  At most 3-4 HD a month, and only 1 migraine since last being seen   Has taken a total of 3 maxalts since it was Rx'd and all worked well without side effects  PT helping   Never used the vistaril  Noticed a slight tremor yesterday in RUE ("I was doing some heavy duty cleaning")  Felt it got better  No other new concerns     HA Historically:   HA HPI:  Start:HA since teenager, "I would like to go over options to treat this HA"  History of trauma (no), History of CNS infection (no), History of Stroke (no)  Location:frontalis and behind the eyes, sometimes stabbing pain in the temples (for years when asked), sometimes will be in occiput  Severity: range: 4-8/10  Duration:4+ hours, longer if she didn't take medicine for bad ones   Frequency:12-15 HD/30 (2 of which migrainous)   How many HA types do you have (?):2 (migraines add nausea and photophobia/phonophobia)   Associated factors (bolded positive) WITH HA (or migraine): Nausea, vomiting, photophobia, phonophobia, tinnitus, scalp pain, vision loss, diplopia, scintillations, eye pain, jaw pain, weakness?    Tried:tylenol   Triggers (in bold): stress, lack of sleep (has been using benadryl for years to sleep. "I also have sinus issues, so it helps that too), too much caffeine, too little caffeine, weather change, seasonal change, strong odours, bright lights, sunlight, food, hormonal changes, TMJ   Last HA: yesterday   Positives in bold: Hx of Kidney Stones, asthma, GI bleed, osteoporosis, CAD/MI, CVA/TIA, DM    Imaging on file: no imaging on head   Therapies tried in past: (failures to be marked, if known---why did it fail?)  MgOx  Zofran  Percocet (in her 20s)   Elavil  Maxalt  Vistaril   PT    As aside a few weeks ago was studying and awoke on the table, unclear if she   "Passed out" or fell asleep. States at times some trouble in sleep. "I was getting a HA and when I woke up I was worried I had a " "stroke because I was confused and had a bad HA. I was also full of drool." Denies Hx of seizure, unclear if she was dehydrated/missing a meal/ over tired, denies incontinence/tongue biting, states unwitnessed, and no stroke symptoms either (e.g., facial droop, slurred speech, numbness/weakness on one side, etc).     Medication Reconciliation:   Current Outpatient Medications   Medication Sig Dispense Refill    amitriptyline (ELAVIL) 10 MG tablet Take 2 pills every night 2 hours before bed 60 tablet 4    biotin 10 mg Tab once daily.       diphenhydrAMINE (BENADRYL) 50 MG capsule Take 50 mg by mouth every 6 (six) hours as needed for Itching.      ergocalciferol, vitamin D2, (VITAMIN D ORAL) once daily.       hydrOXYzine pamoate (VISTARIL) 50 MG Cap 1 pill TID for 2 days, any left over Q8 hours PRN bad headache, no driving/causes sedation 15 capsule 0    INTRAROSA 6.5 mg Inst       ipratropium (ATROVENT) 42 mcg (0.06 %) nasal spray 2 SPRAYS BY NASAL ROUTE 3 (THREE) TIMES DAILY. 30 MINUTES BEFORE MEALS 45 mL 3    magnesium 250 mg Tab once daily.       melatonin 10 mg Cap every evening.       metFORMIN (GLUCOPHAGE-XR) 500 MG ER 24hr tablet Take 1,000 mg by mouth.      progesterone (PROMETRIUM) 200 MG capsule Take 200 mg by mouth nightly.      rizatriptan (MAXALT-MLT) 10 MG disintegrating tablet 1 melt at onset headache, second melt 2 hours later if needed, no more than 2 uses day/3 days use in week 10 tablet 4    semaglutide (OZEMPIC) 0.25 mg or 0.5 mg(2 mg/1.5 mL) pen injector Inject  0.25 mg subcutaneously weekly for 2 weeks then increase to 0.5 mg weekly thereafter. 2 pen 6    TRANEXAMIC ACID ORAL       valACYclovir (VALTREX) 1000 MG tablet Take 1,000 mg by mouth 2 (two) times daily as needed.       No current facility-administered medications for this visit.     Review of patient's allergies indicates:   Allergen Reactions    Bactrim [sulfamethoxazole-trimethoprim] Swelling       PMHx:  Past Medical History: "   Diagnosis Date    Anemia     Headache      Past Surgical History:   Procedure Laterality Date    BREAST BIOPSY       SECTION      x 3    GASTRIC BYPASS      Rodriguez n Y       Fhx:  Family History   Problem Relation Age of Onset    Lung cancer Paternal Grandfather         lung    Colon cancer Paternal Grandmother         colon    Heart disease Maternal Grandfather     Hypertension Father     Heart disease Father     Thyroid nodules Mother     Hypothyroidism Mother     Ovarian cancer Maternal Grandmother     Macular degeneration Neg Hx     Glaucoma Neg Hx     Diabetes Neg Hx        Shx:   Social History     Socioeconomic History    Marital status:    Occupational History    Occupation: nurse    Tobacco Use    Smoking status: Never    Smokeless tobacco: Never   Substance and Sexual Activity    Alcohol use: Yes     Comment: 2x year    Drug use: No    Sexual activity: Yes     Partners: Male     Social Determinants of Health     Financial Resource Strain: Low Risk     Difficulty of Paying Living Expenses: Not hard at all   Food Insecurity: No Food Insecurity    Worried About Running Out of Food in the Last Year: Never true    Ran Out of Food in the Last Year: Never true   Transportation Needs: No Transportation Needs    Lack of Transportation (Medical): No    Lack of Transportation (Non-Medical): No   Physical Activity: Sufficiently Active    Days of Exercise per Week: 5 days    Minutes of Exercise per Session: 30 min   Stress: No Stress Concern Present    Feeling of Stress : Only a little   Social Connections: Unknown    Frequency of Communication with Friends and Family: More than three times a week    Frequency of Social Gatherings with Friends and Family: Twice a week    Active Member of Clubs or Organizations: No    Attends Club or Organization Meetings: Never    Marital Status:    Housing Stability: High Risk    Unable to Pay for Housing in the Last Year: No    Number of Places Lived in the  Last Year: 1    Unstable Housing in the Last Year: Yes           Labs:   Reviewed in chart     Imaging:   Reviewed in chart       Other testing:  Reviewed in chart     Note: I have independently reviewed any/all imaging/labs/tests and agree with the report (s) as documented.  Any discrepancies will be as noted/demarcated by free text.  STEVIE TAM 2/20/2023                     ROS:   Review Of Systems (questions asked, positive or additions in BOLD)  Gen: Weight change, fatigue/malaise, pyrexia   HEENT: Tinnitus, headache,  blurred vision, eye pain, diplopia, photophobia,  nose bleeds, congestion, sore throat, jaw pain, scalp pain, neck stiffness   Card: Palpitations, CP   Pulm: SOB, cough   Vas: Easy bruising, easy bleeding   GI: N/V/D/C, incontinence, hematemesis, hematochezia    : incontinence, hematuria   Endocrine: Temp intolerance, polyuria, polydipsia   M/S: Neck pain, myalgia, back pain, joint pain, falls    Neuro: PER HPI   PSY: Memory loss, confusion, depression, anxiety, trouble in sleep          EXAM:   /71   Pulse 86   Resp 20   Wt 68 kg (149 lb 14.6 oz)   BMI 25.73 kg/m²    GEN:  NAD  HEENT: NC/AT,      EXTREM:   no edema present.    NEURO:  Mental Status:  Awake, alert and appropriately oriented to time, place, and person.  Normal attention and concentration.  Speech is fluent and appropriate language function (I.e., comprehension).     Cranial Nerves:     Extraocular movements are intact and without nystagmus.  Visual fields are full to confrontation testing . Facial movement is symmetric.  Facial sensation is intact.  Hearing is normal. Uvula in midline. DROM of neck in all (6) directions, shoulder shrug symmetrical. Tongue in midline without fasiculation.     Motor:  RUE:appropriate against gravity and medium force as tested 5/5              LUE: appropriate against gravity and medium force as tested 5/5              RLE:appropriate against gravity and medium force as tested 5/5               LLE: appropriate against gravity and medium force as tested 5/5  Tremor/pronator drift not apparent.    finger extension strength was strong bilat   No cogwheel rigidity    Slight kinetic tremor LUE, seen also in winged position but very infrequent and low amplitude     Sensory:  RUE  intact light touch, proprioception, and temperature  LUE intact light touch, proprioception, and temperature    RLE intact light touch  LLE intact light touch      DTR's:                                            R              L  biceps 2+ 2+         brachioradialis 2+ 2+   Knee jerk 2+ 2+           Gait and Stance: Normal manner of stance and gait function testing.           This document has been electronically signed by Mr. Dwight Loyola MPA, PA-C on 2/20/2023, I have personally typed this message using the EMR.       Dr Lorenzo MD  was available during today's visit.     Personal Protective Equipment:    Personal Protective Equipment was used during this encounter including:  mask-surgical and non latex gloves.          CC: Sonja Hewitt DO

## 2023-02-21 ENCOUNTER — LAB VISIT (OUTPATIENT)
Dept: LAB | Facility: HOSPITAL | Age: 48
End: 2023-02-21
Attending: INTERNAL MEDICINE
Payer: COMMERCIAL

## 2023-02-21 DIAGNOSIS — Z12.11 SCREEN FOR COLON CANCER: ICD-10-CM

## 2023-02-21 PROCEDURE — 82274 ASSAY TEST FOR BLOOD FECAL: CPT | Performed by: INTERNAL MEDICINE

## 2023-03-02 ENCOUNTER — PATIENT MESSAGE (OUTPATIENT)
Dept: NEUROLOGY | Facility: CLINIC | Age: 48
End: 2023-03-02
Payer: COMMERCIAL

## 2023-03-07 ENCOUNTER — PATIENT MESSAGE (OUTPATIENT)
Dept: FAMILY MEDICINE | Facility: CLINIC | Age: 48
End: 2023-03-07
Payer: COMMERCIAL

## 2023-03-07 LAB — HEMOCCULT STL QL IA: NEGATIVE

## 2023-03-08 ENCOUNTER — PATIENT MESSAGE (OUTPATIENT)
Dept: FAMILY MEDICINE | Facility: CLINIC | Age: 48
End: 2023-03-08
Payer: COMMERCIAL

## 2023-03-08 RX ORDER — LORAZEPAM 0.5 MG/1
0.5 TABLET ORAL
Qty: 2 TABLET | Refills: 0 | Status: SHIPPED | OUTPATIENT
Start: 2023-03-08 | End: 2023-04-20

## 2023-03-09 ENCOUNTER — DOCUMENTATION ONLY (OUTPATIENT)
Dept: REHABILITATION | Facility: HOSPITAL | Age: 48
End: 2023-03-09
Payer: COMMERCIAL

## 2023-03-09 NOTE — PROGRESS NOTES
Outpatient Therapy Discharge Summary     Name: Rita Grant  Clinic Number: [id number]    Therapy Diagnosis:    Cervicalgia Yes    Poor posture      Physician: Dwight Loyola, PADarC     Physician Orders: PT Eval and Treat   Medical Diagnosis from Referral: M79.18 (ICD-10-CM) - Myofascial pain   Evaluation Date: 12/14/2022      Date of Last visit: 1/4/2023   Total Visits Received: 5  Cancelled Visits: 3  No Show Visits: 0    Assessment    Goals:   Short Term Goals (4 Weeks):   - Pt will demonstrate compliance with initial home exercise program as prescribed by physical therapist to improve independence with management of condition.  - Pt to report cervical pain of <4/10 at worst to improve tolerance to ADLs and work.  - Pt to report reduced frequency of headaches to 3x per week.      Long Term Goals (8 Weeks):   - Pt to report cervical pain of <2/10 at worst to improve tolerance to ADLs and work.  - Pt to report reduced frequency of headaches to 1x per week.     Discharge reason: Patient has not attended therapy since 1/4/2023.    Plan   This patient is discharged from Physical Therapy      Theron Irizarry, PT, DPT  03/09/2023

## 2023-03-10 ENCOUNTER — TELEPHONE (OUTPATIENT)
Dept: FAMILY MEDICINE | Facility: CLINIC | Age: 48
End: 2023-03-10
Payer: COMMERCIAL

## 2023-03-10 NOTE — TELEPHONE ENCOUNTER
----- Message from Sonja Hewitt DO sent at 3/8/2023  4:46 PM CST -----  Please let the patient know that the screen for colon cancer through the stool is negative.  Repeat in one year.    Thanks

## 2023-03-12 ENCOUNTER — PATIENT MESSAGE (OUTPATIENT)
Dept: OPTOMETRY | Facility: CLINIC | Age: 48
End: 2023-03-12
Payer: COMMERCIAL

## 2023-03-15 ENCOUNTER — PATIENT MESSAGE (OUTPATIENT)
Dept: OPTOMETRY | Facility: CLINIC | Age: 48
End: 2023-03-15
Payer: COMMERCIAL

## 2023-03-24 ENCOUNTER — PATIENT MESSAGE (OUTPATIENT)
Dept: NEUROLOGY | Facility: CLINIC | Age: 48
End: 2023-03-24
Payer: COMMERCIAL

## 2023-04-03 ENCOUNTER — PATIENT MESSAGE (OUTPATIENT)
Dept: FAMILY MEDICINE | Facility: CLINIC | Age: 48
End: 2023-04-03
Payer: COMMERCIAL

## 2023-04-03 RX ORDER — SEMAGLUTIDE 1.34 MG/ML
1 INJECTION, SOLUTION SUBCUTANEOUS
Qty: 9 ML | Refills: 3 | Status: SHIPPED | OUTPATIENT
Start: 2023-04-03 | End: 2023-12-05

## 2023-04-20 ENCOUNTER — OFFICE VISIT (OUTPATIENT)
Dept: FAMILY MEDICINE | Facility: CLINIC | Age: 48
End: 2023-04-20
Payer: COMMERCIAL

## 2023-04-20 VITALS
BODY MASS INDEX: 25.43 KG/M2 | SYSTOLIC BLOOD PRESSURE: 116 MMHG | OXYGEN SATURATION: 98 % | HEIGHT: 64 IN | HEART RATE: 69 BPM | TEMPERATURE: 98 F | WEIGHT: 148.94 LBS | RESPIRATION RATE: 20 BRPM | DIASTOLIC BLOOD PRESSURE: 78 MMHG

## 2023-04-20 DIAGNOSIS — R20.8 COLD HANDS AND FEET WITHOUT PERIPHERAL VASCULAR DISEASE: Primary | ICD-10-CM

## 2023-04-20 PROCEDURE — 3078F DIAST BP <80 MM HG: CPT | Mod: CPTII,S$GLB,, | Performed by: INTERNAL MEDICINE

## 2023-04-20 PROCEDURE — 3008F PR BODY MASS INDEX (BMI) DOCUMENTED: ICD-10-PCS | Mod: CPTII,S$GLB,, | Performed by: INTERNAL MEDICINE

## 2023-04-20 PROCEDURE — 99213 OFFICE O/P EST LOW 20 MIN: CPT | Mod: S$GLB,,, | Performed by: INTERNAL MEDICINE

## 2023-04-20 PROCEDURE — 3008F BODY MASS INDEX DOCD: CPT | Mod: CPTII,S$GLB,, | Performed by: INTERNAL MEDICINE

## 2023-04-20 PROCEDURE — 3078F PR MOST RECENT DIASTOLIC BLOOD PRESSURE < 80 MM HG: ICD-10-PCS | Mod: CPTII,S$GLB,, | Performed by: INTERNAL MEDICINE

## 2023-04-20 PROCEDURE — 3074F SYST BP LT 130 MM HG: CPT | Mod: CPTII,S$GLB,, | Performed by: INTERNAL MEDICINE

## 2023-04-20 PROCEDURE — 1159F MED LIST DOCD IN RCRD: CPT | Mod: CPTII,S$GLB,, | Performed by: INTERNAL MEDICINE

## 2023-04-20 PROCEDURE — 3074F PR MOST RECENT SYSTOLIC BLOOD PRESSURE < 130 MM HG: ICD-10-PCS | Mod: CPTII,S$GLB,, | Performed by: INTERNAL MEDICINE

## 2023-04-20 PROCEDURE — 1160F RVW MEDS BY RX/DR IN RCRD: CPT | Mod: CPTII,S$GLB,, | Performed by: INTERNAL MEDICINE

## 2023-04-20 PROCEDURE — 99213 PR OFFICE/OUTPT VISIT, EST, LEVL III, 20-29 MIN: ICD-10-PCS | Mod: S$GLB,,, | Performed by: INTERNAL MEDICINE

## 2023-04-20 PROCEDURE — 1159F PR MEDICATION LIST DOCUMENTED IN MEDICAL RECORD: ICD-10-PCS | Mod: CPTII,S$GLB,, | Performed by: INTERNAL MEDICINE

## 2023-04-20 PROCEDURE — 1160F PR REVIEW ALL MEDS BY PRESCRIBER/CLIN PHARMACIST DOCUMENTED: ICD-10-PCS | Mod: CPTII,S$GLB,, | Performed by: INTERNAL MEDICINE

## 2023-04-20 NOTE — PROGRESS NOTES
Subjective:       Patient ID: Rita Grant is a 48 y.o. female.    Medication List with Changes/Refills   Current Medications    AMITRIPTYLINE (ELAVIL) 10 MG TABLET    Take 2 pills every night 2 hours before bed    BIOTIN 10 MG TAB    once daily.     ERGOCALCIFEROL, VITAMIN D2, (VITAMIN D ORAL)    once daily.     HYDROXYZINE PAMOATE (VISTARIL) 50 MG CAP    1 pill TID for 2 days, any left over Q8 hours PRN bad headache, no driving/causes sedation    INTRAROSA 6.5 MG INST        IPRATROPIUM (ATROVENT) 42 MCG (0.06 %) NASAL SPRAY    2 SPRAYS BY NASAL ROUTE 3 (THREE) TIMES DAILY. 30 MINUTES BEFORE MEALS    MAGNESIUM 250 MG TAB    once daily.     MELATONIN 10 MG CAP    every evening.     MULTIVITAMIN CAPSULE    Take 1 capsule by mouth once daily.    RIZATRIPTAN (MAXALT-MLT) 10 MG DISINTEGRATING TABLET    1 melt at onset headache, second melt 2 hours later if needed, no more than 2 uses day/3 days use in week    SEMAGLUTIDE (OZEMPIC) 1 MG/DOSE (4 MG/3 ML)    Inject 1 mg into the skin every 7 days.    TRANEXAMIC ACID ORAL        VALACYCLOVIR (VALTREX) 1000 MG TABLET    Take 1,000 mg by mouth 2 (two) times daily as needed.   Discontinued Medications    LORAZEPAM (ATIVAN) 0.5 MG TABLET    Take 1 tablet (0.5 mg total) by mouth On call Procedure for Anxiety.    METFORMIN (GLUCOPHAGE-XR) 500 MG ER 24HR TABLET    Take 1,000 mg by mouth.    PROGESTERONE (PROMETRIUM) 200 MG CAPSULE    Take 200 mg by mouth nightly.       Chief Complaint: hand numbness  She presents today with pain in fingers and feet.     She has always been very cold sensitive in her hands and feet.  She was recently started on amitriptyline for migraines and noticed worsening pain in her hands, feet and nose.  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  She has paleness to her fingers but no demarcation or blue discoloration. Better after exposed to warm water. It is painful with tingling and some numbness. It is  "symmetric. No ulcerations or sores on fingers.     She will also have pain in her feet with tingling. This involves her whole foot including toes.  No color changes. It is also symmetric.     She has intermittent burning type pain in her nose that shoots to her teeth. She had this checked with dentist and there is no problems with her teeth. This only occurs in cold weather and only started with amitriptyline.      She does have right sided hip pain but no other pain. No chest pain or shortness of breath. No redness or warmth or swelling in joints.     Review of Systems   Constitutional:  Negative for activity change, appetite change, chills, fatigue, fever and unexpected weight change.   HENT:  Negative for congestion, ear discharge, ear pain, hearing loss, mouth sores, postnasal drip, rhinorrhea, sinus pressure, sore throat and trouble swallowing.    Eyes:  Negative for pain, discharge, redness and visual disturbance.   Respiratory:  Negative for cough, chest tightness, shortness of breath and wheezing.    Cardiovascular:  Negative for chest pain and palpitations.   Gastrointestinal:  Negative for abdominal pain, blood in stool, constipation, diarrhea, nausea and vomiting.   Endocrine: Positive for cold intolerance. Negative for polydipsia and polyuria.   Genitourinary:  Negative for difficulty urinating, dysuria, hematuria and menstrual problem.   Musculoskeletal:  Positive for arthralgias. Negative for joint swelling, neck pain and neck stiffness.   Skin:  Negative for rash.   Neurological:  Negative for weakness and headaches.   Hematological:  Negative for adenopathy.   Psychiatric/Behavioral:  Negative for confusion and dysphoric mood.      Objective:      Vitals:    04/20/23 0714   BP: 116/78   Pulse: 69   Resp: 20   Temp: 98.2 °F (36.8 °C)   SpO2: 98%   Weight: 67.5 kg (148 lb 14.7 oz)   Height: 5' 4" (1.626 m)     Body mass index is 25.56 kg/m².  Physical Exam    General appearance: No acute distress, " cooperative  Eyes: PERRL, EOMI, conjunctiva clear  Ears: normal external ear and pinna, tm clear without drainage, canals clear  Nose: Normal mucosa without drainage, no sores or lesions   Throat: no exudates or erythema, tonsils not enlarged  Mouth: no sores or lesions, moist mucous membranes  Neck: FROM, soft, supple, no thyromegaly, no bruits  Lymph: no anterior or posterior cervical adenopathy  Heart::  Regular rate and rhythm, no murmur  Lung: Clear to ascultation bilaterally, no wheezing, no rales, no rhonchi, no distress  Abdomen: Soft, nontender, no distention, no hepatosplenomegaly, bowel sounds normal, no guarding, no rebound, no peritoneal signs  Skin: no rashes, no lesions  Extremities: no edema, no cyanosis  Neuro: CN 2-12 intact, 5/5 muscle strength upper and lower extremity bilaterally, 2+ DTRs UE and LE bilaterally, normal gait  Peripheral pulses: 2+ pedal pulses and radial pulses bilaterally, good perfusion and color  Musculoskeletal: FROM, good strenth, no tenderness  Joint: normal appearance, no swelling, no warmth, no deformity in all joints    Assessment:       1. Cold hands and feet without peripheral vascular disease        Plan:       Cold hands and feet without peripheral vascular disease  Question etiology of coldness to hands with tingling. Advised to hold amitriptyline to see if symptoms improve. Will check workup for raynaud's.  No evidence of vascular compromise.  No treatment at this time.   -     CBC Auto Differential; Future; Expected date: 04/20/2023  -     Comprehensive Metabolic Panel; Future; Expected date: 04/20/2023  -     ROSE Screen w/Reflex; Future; Expected date: 04/20/2023  -     TSH; Future; Expected date: 04/20/2023  -     HIV 1/2 Ag/Ab (4th Gen); Future; Expected date: 04/20/2023  -     Ferritin; Future; Expected date: 04/20/2023  -     C-Reactive Protein; Future; Expected date: 04/20/2023  -     C4 COMPLEMENT; Future; Expected date: 04/20/2023  -     C3 COMPLEMENT;  Future; Expected date: 04/20/2023  -     VITAMIN B6; Future; Expected date: 04/20/2023  -     VITAMIN B12; Future; Expected date: 04/20/2023  -     VITAMIN B1; Future; Expected date: 04/20/2023    Follow up if symptoms worsen or fail to improve.

## 2023-04-21 ENCOUNTER — PATIENT MESSAGE (OUTPATIENT)
Dept: FAMILY MEDICINE | Facility: CLINIC | Age: 48
End: 2023-04-21
Payer: COMMERCIAL

## 2023-04-22 ENCOUNTER — PATIENT MESSAGE (OUTPATIENT)
Dept: FAMILY MEDICINE | Facility: CLINIC | Age: 48
End: 2023-04-22
Payer: COMMERCIAL

## 2023-04-25 ENCOUNTER — PATIENT MESSAGE (OUTPATIENT)
Dept: FAMILY MEDICINE | Facility: CLINIC | Age: 48
End: 2023-04-25
Payer: COMMERCIAL

## 2023-05-17 ENCOUNTER — PATIENT MESSAGE (OUTPATIENT)
Dept: NEUROLOGY | Facility: CLINIC | Age: 48
End: 2023-05-17
Payer: COMMERCIAL

## 2023-05-22 ENCOUNTER — OFFICE VISIT (OUTPATIENT)
Dept: NEUROLOGY | Facility: CLINIC | Age: 48
End: 2023-05-22
Payer: COMMERCIAL

## 2023-05-22 DIAGNOSIS — G43.009 MIGRAINE WITHOUT AURA AND WITHOUT STATUS MIGRAINOSUS, NOT INTRACTABLE: Primary | ICD-10-CM

## 2023-05-22 DIAGNOSIS — G44.209 TENSION HEADACHE: ICD-10-CM

## 2023-05-22 PROCEDURE — 1159F MED LIST DOCD IN RCRD: CPT | Mod: CPTII,95,, | Performed by: PHYSICIAN ASSISTANT

## 2023-05-22 PROCEDURE — 1159F PR MEDICATION LIST DOCUMENTED IN MEDICAL RECORD: ICD-10-PCS | Mod: CPTII,95,, | Performed by: PHYSICIAN ASSISTANT

## 2023-05-22 PROCEDURE — 99213 OFFICE O/P EST LOW 20 MIN: CPT | Mod: 95,,, | Performed by: PHYSICIAN ASSISTANT

## 2023-05-22 PROCEDURE — 1160F PR REVIEW ALL MEDS BY PRESCRIBER/CLIN PHARMACIST DOCUMENTED: ICD-10-PCS | Mod: CPTII,95,, | Performed by: PHYSICIAN ASSISTANT

## 2023-05-22 PROCEDURE — 1160F RVW MEDS BY RX/DR IN RCRD: CPT | Mod: CPTII,95,, | Performed by: PHYSICIAN ASSISTANT

## 2023-05-22 PROCEDURE — 99213 PR OFFICE/OUTPT VISIT, EST, LEVL III, 20-29 MIN: ICD-10-PCS | Mod: 95,,, | Performed by: PHYSICIAN ASSISTANT

## 2023-05-22 RX ORDER — NORTRIPTYLINE HYDROCHLORIDE 10 MG/1
10 CAPSULE ORAL NIGHTLY
Qty: 30 CAPSULE | Refills: 5 | Status: SHIPPED | OUTPATIENT
Start: 2023-05-22 | End: 2023-07-25 | Stop reason: SDUPTHER

## 2023-05-22 NOTE — PROGRESS NOTES
[facility]  [address] [place] [address]  Phone:[phone]  Fax: [phone]   Follow up visit -telemed    Provider Location: Work         Name of Location: [facility]  City: [place]   State: [place]  Medium to connect: Video  Patient Location: home  Name of Location:   home                                   City: [place]                                                              State: [place]                                         Consent for Electronic Treatment:   This visit was conducted with the use of an interactive audio and/or video telecommunications system that permits real-time communication between the patient and this provider. The patient has submitted their consent to be treated electronically by way of this telephone and/or video technology.  The risks and limitations of the process of telemedicine have been conveyed verbally during this encounter.Each patient to whom he or she provides medical services by telemedicine is:  (1) informed of the relationship between the physician and patient and the respective role of any other health care provider with respect to management of the patient; and (2) notified that he or she may decline to receive medical services by telemedicine and may withdraw from such care at any time.    Face to Face time with patient:   22 minutes of total time spent on the encounter, which includes face to face time and non-face to face time preparing to see the patient (eg, review of tests), Obtaining and/or reviewing separately obtained history, Documenting clinical information in the electronic or other health record, Independently interpreting results (not separately reported) and communicating results to the patient/family/caregiver, or Care coordination (not separately reported).       Patient Name: Rita Grant  : 1975  MRN:  [id number]  Today: 2023   LOV:  2023  chief complaint:  Headache      PCP: Sonja Hewitt DO.       Assessment:   Rita Grant is a 48 y.o. right handed female with a PMHx of: gastric bypass, raynauds, TMJ, HA, and HLD   whom presents solo via telemed in follow up. Appears to have episodic  migrainous HA and more chronic tension HA. Was doing well on elavil until she noticed worsening hand pain (raynauds) and hair loss.          Review:  No diagnosis found.          Plan:               -if HA change in quality/nature, will get updated imaging study          For HA Prevention:  Stop elavil   Try pamelor at 10 mg Q2h before bed. If similar issues with this medicine may need to consider gabapentin (will avoid topamax d/t paresthesia and potential for hair loss)        For HA :  Has maxalt     To break up Headaches:  Has course of vistaril still available if the HA should return               All test results as well as any necessary instructions were reviewed and discussed with patient.    Review:  Orders Placed This Encounter    nortriptyline (PAMELOR) 10 MG capsule             Patient to return to PCP/other specialists for all other problems  Patient to continue on all medications as Rx'd   Full office note available for her reference online in secured patient portal   RTO- 2 months, she states she will schedule online   The patient indicates understanding of these issues and agrees to the plan.    HPI:   Rita Grant is a 48 y.o.right handed, female with a PMHx of: gastric bypass, raynauds, TMJ, HA, and HLD   whom presents solo via telemed in follow up for HA. in follow up for HA.     At last visit: continued elavil, had maxalt and vistaril. Sent message in May 2023 that PCP stopped her elavil d/t hand pain and hair loss (?).   Frontalis (dull)  Daily HA (30 HD/30)  Last >4, up to full day   Triggers bright light/sung light   Felt she was having worsening pain from her raynauds and hair loss (?) from 10 mg of elavil (?)        -feels both have  "improved, but also notices it is warmer now regarding raynauds  Was on elavil at 10 mg for a few months before noticing the above issues  Felt when was on elavil her HA were absent  Mild HA no, no other concerns   Hasn't had to use maxalt or vistaril       At last visit: started elavil, maxalt written along with a course of vistaril to break up her HA. PT was also ordered.   HA triggered by stress and certain lighting  Elavil helping without side effects  At most 3-4 HD a month, and only 1 migraine since last being seen   Has taken a total of 3 maxalts since it was Rx'd and all worked well without side effects  PT helping   Never used the vistaril  Noticed a slight tremor yesterday in RUE ("I was doing some heavy duty cleaning")  Felt it got better  No other new concerns     HA Historically:   HA HPI:  Start:HA since teenager, "I would like to go over options to treat this HA"  History of trauma (no), History of CNS infection (no), History of Stroke (no)  Location:frontalis and behind the eyes, sometimes stabbing pain in the temples (for years when asked), sometimes will be in occiput  Severity: range: 4-8/10  Duration:4+ hours, longer if she didn't take medicine for bad ones   Frequency:12-15 HD/30 (2 of which migrainous)   How many HA types do you have (?):2 (migraines add nausea and photophobia/phonophobia)   Associated factors (bolded positive) WITH HA (or migraine): Nausea, vomiting, photophobia, phonophobia, tinnitus, scalp pain, vision loss, diplopia, scintillations, eye pain, jaw pain, weakness?    Tried:tylenol   Triggers (in bold): stress, lack of sleep (has been using benadryl for years to sleep. "I also have sinus issues, so it helps that too), too much caffeine, too little caffeine, weather change, seasonal change, strong odours, bright lights, sunlight, food, hormonal changes, TMJ   Last HA: yesterday   Positives in bold: Hx of Kidney Stones, asthma, GI bleed, osteoporosis, CAD/MI, CVA/TIA, DM  " "  Imaging on file: no imaging on head   Therapies tried in past: (failures to be marked, if known---why did it fail?)  MgOx  Zofran  Percocet (in her 20s)   Elavil  Maxalt  Vistaril   PT    As aside a few weeks ago was studying and awoke on the table, unclear if she   "Passed out" or fell asleep. States at times some trouble in sleep. "I was getting a HA and when I woke up I was worried I had a stroke because I was confused and had a bad HA. I was also full of drool." Denies Hx of seizure, unclear if she was dehydrated/missing a meal/ over tired, denies incontinence/tongue biting, states unwitnessed, and no stroke symptoms either (e.g., facial droop, slurred speech, numbness/weakness on one side, etc).     Medication Reconciliation:   Current Outpatient Medications   Medication Sig Dispense Refill    amitriptyline (ELAVIL) 10 MG tablet Take 2 pills every night 2 hours before bed 60 tablet 6    biotin 10 mg Tab once daily.       ergocalciferol, vitamin D2, (VITAMIN D ORAL) once daily.       hydrOXYzine pamoate (VISTARIL) 50 MG Cap 1 pill TID for 2 days, any left over Q8 hours PRN bad headache, no driving/causes sedation 15 capsule 0    INTRAROSA 6.5 mg Inst       ipratropium (ATROVENT) 42 mcg (0.06 %) nasal spray 2 SPRAYS BY NASAL ROUTE 3 (THREE) TIMES DAILY. 30 MINUTES BEFORE MEALS 45 mL 3    magnesium 250 mg Tab once daily.       melatonin 10 mg Cap every evening.       multivitamin capsule Take 1 capsule by mouth once daily.      rizatriptan (MAXALT-MLT) 10 MG disintegrating tablet 1 melt at onset headache, second melt 2 hours later if needed, no more than 2 uses day/3 days use in week 10 tablet 6    semaglutide (OZEMPIC) 1 mg/dose (4 mg/3 mL) Inject 1 mg into the skin every 7 days. 9 mL 3    TRANEXAMIC ACID ORAL       valACYclovir (VALTREX) 1000 MG tablet Take 1,000 mg by mouth 2 (two) times daily as needed.       No current facility-administered medications for this visit.     Review of patient's allergies " indicates:   Allergen Reactions    Bactrim [sulfamethoxazole-trimethoprim] Swelling       PMHx:  Past Medical History:   Diagnosis Date    Anemia     Headache      Past Surgical History:   Procedure Laterality Date    BREAST BIOPSY       SECTION      x 3    GASTRIC BYPASS      Rodriguez n Y       Fhx:  Family History   Problem Relation Age of Onset    Lung cancer Paternal Grandfather         lung    Colon cancer Paternal Grandmother         colon    Heart disease Maternal Grandfather     Hypertension Father     Heart disease Father     Thyroid nodules Mother     Hypothyroidism Mother     Ovarian cancer Maternal Grandmother     Macular degeneration Neg Hx     Glaucoma Neg Hx     Diabetes Neg Hx        Shx:   Social History     Socioeconomic History    Marital status:    Occupational History    Occupation: nurse    Tobacco Use    Smoking status: Never    Smokeless tobacco: Never   Substance and Sexual Activity    Alcohol use: Yes     Comment: 2x year    Drug use: No    Sexual activity: Yes     Partners: Male     Social Determinants of Health     Financial Resource Strain: Low Risk     Difficulty of Paying Living Expenses: Not hard at all   Food Insecurity: No Food Insecurity    Worried About Running Out of Food in the Last Year: Never true    Ran Out of Food in the Last Year: Never true   Transportation Needs: No Transportation Needs    Lack of Transportation (Medical): No    Lack of Transportation (Non-Medical): No   Physical Activity: Insufficiently Active    Days of Exercise per Week: 3 days    Minutes of Exercise per Session: 30 min   Stress: No Stress Concern Present    Feeling of Stress : Not at all   Social Connections: Unknown    Frequency of Communication with Friends and Family: More than three times a week    Frequency of Social Gatherings with Friends and Family: More than three times a week    Active Member of Clubs or Organizations: No    Attends Club or Organization Meetings: Never     Marital Status:    Housing Stability: Low Risk     Unable to Pay for Housing in the Last Year: No    Number of Places Lived in the Last Year: 1    Unstable Housing in the Last Year: No           Labs:   Reviewed in chart     Imaging:   Reviewed in chart       Other testing:  Reviewed in chart     Note: I have independently reviewed any/all imaging/labs/tests and agree with the report (s) as documented.  Any discrepancies will be as noted/demarcated by free text.  STEVIE TAM 5/22/2023                     ROS:   Review Of Systems (questions asked, positive or additions in BOLD)  Gen: Weight change, fatigue/malaise, pyrexia   HEENT: Tinnitus, headache,  blurred vision, eye pain, diplopia, photophobia,  nose bleeds, congestion, sore throat, jaw pain, scalp pain, neck stiffness   Card: Palpitations, CP   Pulm: SOB, cough   Vas: Easy bruising, easy bleeding   GI: N/V/D/C, incontinence, hematemesis, hematochezia    : incontinence, hematuria   Endocrine: Temp intolerance, polyuria, polydipsia   M/S: Neck pain, myalgia, back pain, joint pain, falls    Neuro: PER HPI   PSY: Memory loss, confusion, depression, anxiety, trouble in sleep          EXAM:   There were no vitals taken for this visit. <----none taken as this was a virtual visit   GEN:  NAD  HEENT: NC/AT      EXTREM:   no edema present.    NEURO:  Mental Status:  Awake, alert and appropriately oriented to time, place, and person.  Normal attention and concentration.  Speech is fluent and appropriate language function (I.e., comprehension).     Cranial Nerves:      Facial movement is symmetric. Hearing appears intact. Uvula in midline. DROM of neck in all (6) directions, shoulder shrug symmetrical. Tongue in midline without fasiculation.     Motor:  antigravity bilat UE  No drift  No resting tremor        Gait and Stance: sit to stand without assistance, gait not tested         This document has been electronically signed by Mr. Dwight Loyola MPA, PA-C on  5/22/2023, I have personally typed this message using the EMR.       Dr Lorenzo MD  was available during today's visit.            CC: Sonja Hewitt, DO

## 2023-05-26 RX ORDER — SCOLOPAMINE TRANSDERMAL SYSTEM 1 MG/1
1 PATCH, EXTENDED RELEASE TRANSDERMAL
Qty: 3 PATCH | Refills: 0 | Status: SHIPPED | OUTPATIENT
Start: 2023-05-26 | End: 2023-12-03

## 2023-07-25 ENCOUNTER — OFFICE VISIT (OUTPATIENT)
Dept: NEUROLOGY | Facility: CLINIC | Age: 48
End: 2023-07-25
Payer: COMMERCIAL

## 2023-07-25 VITALS
RESPIRATION RATE: 17 BRPM | WEIGHT: 148.56 LBS | BODY MASS INDEX: 25.36 KG/M2 | SYSTOLIC BLOOD PRESSURE: 107 MMHG | DIASTOLIC BLOOD PRESSURE: 74 MMHG | HEART RATE: 76 BPM | HEIGHT: 64 IN

## 2023-07-25 DIAGNOSIS — G43.009 MIGRAINE WITHOUT AURA AND WITHOUT STATUS MIGRAINOSUS, NOT INTRACTABLE: Primary | ICD-10-CM

## 2023-07-25 PROCEDURE — 3078F DIAST BP <80 MM HG: CPT | Mod: CPTII,S$GLB,, | Performed by: PHYSICIAN ASSISTANT

## 2023-07-25 PROCEDURE — 1159F PR MEDICATION LIST DOCUMENTED IN MEDICAL RECORD: ICD-10-PCS | Mod: CPTII,S$GLB,, | Performed by: PHYSICIAN ASSISTANT

## 2023-07-25 PROCEDURE — 1160F PR REVIEW ALL MEDS BY PRESCRIBER/CLIN PHARMACIST DOCUMENTED: ICD-10-PCS | Mod: CPTII,S$GLB,, | Performed by: PHYSICIAN ASSISTANT

## 2023-07-25 PROCEDURE — 1159F MED LIST DOCD IN RCRD: CPT | Mod: CPTII,S$GLB,, | Performed by: PHYSICIAN ASSISTANT

## 2023-07-25 PROCEDURE — 3008F BODY MASS INDEX DOCD: CPT | Mod: CPTII,S$GLB,, | Performed by: PHYSICIAN ASSISTANT

## 2023-07-25 PROCEDURE — 99999 PR PBB SHADOW E&M-EST. PATIENT-LVL IV: ICD-10-PCS | Mod: PBBFAC,,, | Performed by: PHYSICIAN ASSISTANT

## 2023-07-25 PROCEDURE — 3008F PR BODY MASS INDEX (BMI) DOCUMENTED: ICD-10-PCS | Mod: CPTII,S$GLB,, | Performed by: PHYSICIAN ASSISTANT

## 2023-07-25 PROCEDURE — 99213 OFFICE O/P EST LOW 20 MIN: CPT | Mod: S$GLB,,, | Performed by: PHYSICIAN ASSISTANT

## 2023-07-25 PROCEDURE — 99999 PR PBB SHADOW E&M-EST. PATIENT-LVL IV: CPT | Mod: PBBFAC,,, | Performed by: PHYSICIAN ASSISTANT

## 2023-07-25 PROCEDURE — 3074F SYST BP LT 130 MM HG: CPT | Mod: CPTII,S$GLB,, | Performed by: PHYSICIAN ASSISTANT

## 2023-07-25 PROCEDURE — 1160F RVW MEDS BY RX/DR IN RCRD: CPT | Mod: CPTII,S$GLB,, | Performed by: PHYSICIAN ASSISTANT

## 2023-07-25 PROCEDURE — 3078F PR MOST RECENT DIASTOLIC BLOOD PRESSURE < 80 MM HG: ICD-10-PCS | Mod: CPTII,S$GLB,, | Performed by: PHYSICIAN ASSISTANT

## 2023-07-25 PROCEDURE — 3074F PR MOST RECENT SYSTOLIC BLOOD PRESSURE < 130 MM HG: ICD-10-PCS | Mod: CPTII,S$GLB,, | Performed by: PHYSICIAN ASSISTANT

## 2023-07-25 PROCEDURE — 99213 PR OFFICE/OUTPT VISIT, EST, LEVL III, 20-29 MIN: ICD-10-PCS | Mod: S$GLB,,, | Performed by: PHYSICIAN ASSISTANT

## 2023-07-25 RX ORDER — NORTRIPTYLINE HYDROCHLORIDE 10 MG/1
10 CAPSULE ORAL NIGHTLY
Qty: 30 CAPSULE | Refills: 5 | Status: SHIPPED | OUTPATIENT
Start: 2023-07-25 | End: 2024-01-02 | Stop reason: SDUPTHER

## 2023-07-25 RX ORDER — RIZATRIPTAN BENZOATE 10 MG/1
TABLET, ORALLY DISINTEGRATING ORAL
Qty: 10 TABLET | Refills: 6 | Status: SHIPPED | OUTPATIENT
Start: 2023-07-25 | End: 2024-01-02 | Stop reason: SDUPTHER

## 2023-07-25 RX ORDER — HYDROXYZINE PAMOATE 50 MG/1
CAPSULE ORAL
Qty: 15 CAPSULE | Refills: 0 | Status: SHIPPED | OUTPATIENT
Start: 2023-07-25 | End: 2024-01-02 | Stop reason: SDUPTHER

## 2023-07-25 NOTE — PROGRESS NOTES
[facility]  [facility]  [address] [facility] [address]  [place] [address]  Phone:[phone]  Fax: [phone]   Follow up visit       Patient Name: Rita Grant  : 1975  MRN:  [id number]  Today: 2023   LOV:  2023  chief complaint: Headache      PCP: Sonja Hewitt DO.       Assessment:   Rita Grant is a 48 y.o. right handed female with a PMHx of: gastric bypass, raynauds, TMJ, HA, and HLD   whom presents solo   in follow up. Appears to have episodic  migrainous HA. Well controlled on current regimen.          Review:    ICD-10-CM ICD-9-CM   1. Migraine without aura and without status migrainosus, not intractable  G43.009 346.10             Plan:               -if HA change in quality/nature, will get updated imaging study          For HA Prevention:  Pamelor 10 mg Q2h before bed, refills given     For HA :  Maxalt refilled     To break up Headaches:  Vistaril refilled             All test results as well as any necessary instructions were reviewed and discussed with patient.    Review:  Orders Placed This Encounter    hydrOXYzine pamoate (VISTARIL) 50 MG Cap    nortriptyline (PAMELOR) 10 MG capsule    rizatriptan (MAXALT-MLT) 10 MG disintegrating tablet               Patient to return to PCP/other specialists for all other problems  Patient to continue on all medications as Rx'd   Full office note available for her reference online in secured patient portal   RTO- 6 months, she states she will schedule online   The patient indicates understanding of these issues and agrees to the plan.    HPI:   Rita Grant is a 48 y.o.right handed, female with a PMHx of: gastric bypass, raynauds, TMJ, HA, and HLD   whom presents solo  in follow up for HA.     At last visit: stopped elavil and started pamelor. Maxalt and vistaril available.   Liking pamelor, no side effects like the elavil   1st bad HA yesterday, possibly d/t sun exposure, maxalt was  "abortive  Pain along frontalis   Did use vistaril last night, because had some trouble sleeping, no side effects (except for some drowsiness this morning)  Doesn't want to make any medication changes         At last visit: continued elavil, had maxalt and vistaril. Sent message in May 2023 that PCP stopped her elavil d/t hand pain and hair loss (?).   Frontalis (dull)  Daily HA (30 HD/30)  Last >4, up to full day   Triggers bright light/sung light   Felt she was having worsening pain from her raynauds and hair loss (?) from 10 mg of elavil (?)        -feels both have improved, but also notices it is warmer now regarding raynauds  Was on elavil at 10 mg for a few months before noticing the above issues  Felt when was on elavil her HA were absent  Mild HA no, no other concerns   Hasn't had to use maxalt or vistaril       At last visit: started elavil, maxalt written along with a course of vistaril to break up her HA. PT was also ordered.   HA triggered by stress and certain lighting  Elavil helping without side effects  At most 3-4 HD a month, and only 1 migraine since last being seen   Has taken a total of 3 maxalts since it was Rx'd and all worked well without side effects  PT helping   Never used the vistaril  Noticed a slight tremor yesterday in RUE ("I was doing some heavy duty cleaning")  Felt it got better  No other new concerns     HA Historically:   HA HPI:  Start:HA since teenager, "I would like to go over options to treat this HA"  History of trauma (no), History of CNS infection (no), History of Stroke (no)  Location:frontalis and behind the eyes, sometimes stabbing pain in the temples (for years when asked), sometimes will be in occiput  Severity: range: 4-8/10  Duration:4+ hours, longer if she didn't take medicine for bad ones   Frequency:12-15 HD/30 (2 of which migrainous)   How many HA types do you have (?):2 (migraines add nausea and photophobia/phonophobia)   Associated factors (bolded positive) WITH " "HA (or migraine): Nausea, vomiting, photophobia, phonophobia, tinnitus, scalp pain, vision loss, diplopia, scintillations, eye pain, jaw pain, weakness?    Tried:tylenol   Triggers (in bold): stress, lack of sleep (has been using benadryl for years to sleep. "I also have sinus issues, so it helps that too), too much caffeine, too little caffeine, weather change, seasonal change, strong odours, bright lights, sunlight, food, hormonal changes, TMJ   Last HA: yesterday   Positives in bold: Hx of Kidney Stones, asthma, GI bleed, osteoporosis, CAD/MI, CVA/TIA, DM    Imaging on file: no imaging on head   Therapies tried in past: (failures to be marked, if known---why did it fail?)  MgOx  Zofran  Percocet (in her 20s)   Elavil  Maxalt  Vistaril   PT  Pamelor      As aside a few weeks ago was studying and awoke on the table, unclear if she   "Passed out" or fell asleep. States at times some trouble in sleep. "I was getting a HA and when I woke up I was worried I had a stroke because I was confused and had a bad HA. I was also full of drool." Denies Hx of seizure, unclear if she was dehydrated/missing a meal/ over tired, denies incontinence/tongue biting, states unwitnessed, and no stroke symptoms either (e.g., facial droop, slurred speech, numbness/weakness on one side, etc).     Medication Reconciliation:   Current Outpatient Medications   Medication Sig Dispense Refill    biotin 10 mg Tab once daily.       ergocalciferol, vitamin D2, (VITAMIN D ORAL) once daily.       hydrOXYzine pamoate (VISTARIL) 50 MG Cap 1 pill TID for 2 days, any left over Q8 hours PRN bad headache, no driving/causes sedation 15 capsule 0    INTRAROSA 6.5 mg Inst       ipratropium (ATROVENT) 42 mcg (0.06 %) nasal spray 2 SPRAYS BY NASAL ROUTE 3 (THREE) TIMES DAILY. 30 MINUTES BEFORE MEALS 45 mL 3    magnesium 250 mg Tab once daily.       melatonin 10 mg Cap every evening.       multivitamin capsule Take 1 capsule by mouth once daily.      " nortriptyline (PAMELOR) 10 MG capsule Take 1 capsule (10 mg total) by mouth every evening. 30 capsule 5    rizatriptan (MAXALT-MLT) 10 MG disintegrating tablet 1 melt at onset headache, second melt 2 hours later if needed, no more than 2 uses day/3 days use in week 10 tablet 6    scopolamine (TRANSDERM-SCOP) 1.3-1.5 mg (1 mg over 3 days) Place 1 patch onto the skin every 72 hours. 3 patch 0    semaglutide (OZEMPIC) 1 mg/dose (4 mg/3 mL) Inject 1 mg into the skin every 7 days. 9 mL 3    TRANEXAMIC ACID ORAL       valACYclovir (VALTREX) 1000 MG tablet Take 1,000 mg by mouth 2 (two) times daily as needed.       No current facility-administered medications for this visit.     Review of patient's allergies indicates:   Allergen Reactions    Bactrim [sulfamethoxazole-trimethoprim] Swelling       PMHx:  Past Medical History:   Diagnosis Date    Anemia     Headache      Past Surgical History:   Procedure Laterality Date    BREAST BIOPSY       SECTION      x 3    GASTRIC BYPASS      Rodriguez n Y       Fhx:  Family History   Problem Relation Age of Onset    Lung cancer Paternal Grandfather         lung    Colon cancer Paternal Grandmother         colon    Heart disease Maternal Grandfather     Hypertension Father     Heart disease Father     Thyroid nodules Mother     Hypothyroidism Mother     Ovarian cancer Maternal Grandmother     Macular degeneration Neg Hx     Glaucoma Neg Hx     Diabetes Neg Hx        Shx:   Social History     Socioeconomic History    Marital status:    Occupational History    Occupation: nurse    Tobacco Use    Smoking status: Never    Smokeless tobacco: Never   Substance and Sexual Activity    Alcohol use: Yes     Comment: 2x year    Drug use: No    Sexual activity: Yes     Partners: Male     Social Determinants of Health     Financial Resource Strain: Low Risk     Difficulty of Paying Living Expenses: Not hard at all   Food Insecurity: No Food Insecurity    Worried About Running Out of  Food in the Last Year: Never true    Ran Out of Food in the Last Year: Never true   Transportation Needs: No Transportation Needs    Lack of Transportation (Medical): No    Lack of Transportation (Non-Medical): No   Physical Activity: Insufficiently Active    Days of Exercise per Week: 3 days    Minutes of Exercise per Session: 40 min   Stress: No Stress Concern Present    Feeling of Stress : Not at all   Social Connections: Unknown    Frequency of Communication with Friends and Family: More than three times a week    Frequency of Social Gatherings with Friends and Family: More than three times a week    Active Member of Clubs or Organizations: No    Attends Club or Organization Meetings: Never    Marital Status:    Housing Stability: Low Risk     Unable to Pay for Housing in the Last Year: No    Number of Places Lived in the Last Year: 1    Unstable Housing in the Last Year: No           Labs:   Reviewed in chart     Imaging:   Reviewed in chart       Other testing:  Reviewed in chart     Note: I have independently reviewed any/all imaging/labs/tests and agree with the report (s) as documented.  Any discrepancies will be as noted/demarcated by free text.  STEVIE TAM 7/25/2023                     ROS:   Review Of Systems (questions asked, positive or additions in BOLD)  Gen: Weight change, fatigue/malaise, pyrexia   HEENT: Tinnitus, headache,  blurred vision, eye pain, diplopia, photophobia,  nose bleeds, congestion, sore throat, jaw pain, scalp pain, neck stiffness   Card: Palpitations, CP   Pulm: SOB, cough   Vas: Easy bruising, easy bleeding   GI: N/V/D/C, incontinence, hematemesis, hematochezia    : incontinence, hematuria   Endocrine: Temp intolerance, polyuria, polydipsia   M/S: Neck pain, myalgia, back pain, joint pain, falls    Neuro: PER HPI   PSY: Memory loss, confusion, depression, anxiety, trouble in sleep          EXAM:   /74 (BP Location: Right arm, Patient Position: Sitting, BP Method:  "Medium (Automatic))   Pulse 76   Resp 17   Ht 5' 4" (1.626 m)   Wt 67.4 kg (148 lb 9.4 oz)   BMI 25.51 kg/m²     GEN:  NAD  HEENT: NC/AT      EXTREM:   no edema present.    NEURO:  Mental Status:  Awake, alert and appropriately oriented to time, place, and person.  Normal attention and concentration.  Speech is fluent and appropriate language function (I.e., comprehension).      Cranial Nerves:     Extraocular movements are intact and without nystagmus.   Facial movement is symmetric.  Facial sensation is intact.  Hearing is normal. Uvula in midline. DROM of neck in all (6) directions, shoulder shrug symmetrical. Tongue in midline without fasiculation.                Motor: antigravity in all limbs  No drift  No resting tremor      Gait and Stance: Normal manner of stance and gait function testing.                 This document has been electronically signed by Mr. Dwight Loyola MPA, PA-C on 7/25/2023, I have personally typed this message using the EMR.       Dr Lorenzo MD  was available during today's visit.            CC: Sonja Hewitt, DO                "

## 2023-09-11 ENCOUNTER — PATIENT MESSAGE (OUTPATIENT)
Dept: ADMINISTRATIVE | Facility: HOSPITAL | Age: 48
End: 2023-09-11
Payer: COMMERCIAL

## 2023-09-11 ENCOUNTER — PATIENT OUTREACH (OUTPATIENT)
Dept: ADMINISTRATIVE | Facility: HOSPITAL | Age: 48
End: 2023-09-11
Payer: COMMERCIAL

## 2023-09-11 DIAGNOSIS — Z12.31 OTHER SCREENING MAMMOGRAM: ICD-10-CM

## 2023-09-11 NOTE — PROGRESS NOTES
WEEKLY BULK ORDER REPORT.  ORDER PLACEDBREAST CANCER SCREENING    Non-compliant report chart audits for BREAST CANCER SCREENING     Outreach to patient in reference to SCHEDULING A MAMMOGRAM EXAM.     WEEKLY BULK ORDER REPORT.  ORDER PLACED

## 2023-10-24 ENCOUNTER — PATIENT MESSAGE (OUTPATIENT)
Dept: FAMILY MEDICINE | Facility: CLINIC | Age: 48
End: 2023-10-24
Payer: COMMERCIAL

## 2023-10-24 DIAGNOSIS — Z00.00 LABORATORY EXAMINATION ORDERED AS PART OF A COMPLETE PHYSICAL EXAMINATION: ICD-10-CM

## 2023-10-24 DIAGNOSIS — Z01.84 ENCOUNTER FOR ANTIBODY RESPONSE EXAMINATION: Primary | ICD-10-CM

## 2023-10-24 DIAGNOSIS — Z11.1 SCREENING FOR TUBERCULOSIS: ICD-10-CM

## 2023-10-27 NOTE — TELEPHONE ENCOUNTER
LVM with clinic call back number and main scheduling line if pt needs assistance with scheduling December labs. Also, sent my chart message.

## 2023-11-27 ENCOUNTER — TELEPHONE (OUTPATIENT)
Dept: FAMILY MEDICINE | Facility: CLINIC | Age: 48
End: 2023-11-27
Payer: COMMERCIAL

## 2023-11-27 NOTE — TELEPHONE ENCOUNTER
----- Message from Abigail Stephen sent at 11/27/2023  2:20 PM CST -----  Regarding: appt request  Type: Appointment Request    Caller is requesting an appointment.      Name of Caller:pt    Would the patient rather a call back or a response via [facility]? Call back    Best Call Back Number:[phone]    Additional Information: Pt would like to schedule labs. Please advise.  Thank you.

## 2023-12-04 ENCOUNTER — LAB VISIT (OUTPATIENT)
Dept: LAB | Facility: HOSPITAL | Age: 48
End: 2023-12-04
Attending: INTERNAL MEDICINE
Payer: COMMERCIAL

## 2023-12-04 DIAGNOSIS — Z11.1 SCREENING FOR TUBERCULOSIS: ICD-10-CM

## 2023-12-04 PROCEDURE — 86480 TB TEST CELL IMMUN MEASURE: CPT | Performed by: INTERNAL MEDICINE

## 2023-12-05 ENCOUNTER — PATIENT MESSAGE (OUTPATIENT)
Dept: FAMILY MEDICINE | Facility: CLINIC | Age: 48
End: 2023-12-05

## 2023-12-05 ENCOUNTER — OFFICE VISIT (OUTPATIENT)
Dept: FAMILY MEDICINE | Facility: CLINIC | Age: 48
End: 2023-12-05
Payer: COMMERCIAL

## 2023-12-05 VITALS
HEART RATE: 90 BPM | DIASTOLIC BLOOD PRESSURE: 72 MMHG | TEMPERATURE: 98 F | SYSTOLIC BLOOD PRESSURE: 112 MMHG | WEIGHT: 152.25 LBS | HEIGHT: 64 IN | OXYGEN SATURATION: 99 % | RESPIRATION RATE: 16 BRPM | BODY MASS INDEX: 25.99 KG/M2

## 2023-12-05 DIAGNOSIS — E78.5 HYPERLIPIDEMIA, UNSPECIFIED HYPERLIPIDEMIA TYPE: ICD-10-CM

## 2023-12-05 DIAGNOSIS — E55.9 VITAMIN D DEFICIENCY: ICD-10-CM

## 2023-12-05 DIAGNOSIS — R51.9 MIXED HEADACHE: ICD-10-CM

## 2023-12-05 DIAGNOSIS — E53.8 VITAMIN B 12 DEFICIENCY: ICD-10-CM

## 2023-12-05 DIAGNOSIS — R20.8 COLD HANDS AND FEET WITHOUT PERIPHERAL VASCULAR DISEASE: ICD-10-CM

## 2023-12-05 DIAGNOSIS — F51.01 PRIMARY INSOMNIA: ICD-10-CM

## 2023-12-05 DIAGNOSIS — Z98.84 HISTORY OF ROUX-EN-Y GASTRIC BYPASS: ICD-10-CM

## 2023-12-05 DIAGNOSIS — Z00.00 WELL ADULT EXAM: Primary | ICD-10-CM

## 2023-12-05 LAB
GAMMA INTERFERON BACKGROUND BLD IA-ACNC: 0.07 IU/ML
M TB IFN-G CD4+ BCKGRND COR BLD-ACNC: 0 IU/ML
M TB IFN-G CD4+ BCKGRND COR BLD-ACNC: 0.02 IU/ML
MITOGEN IGNF BCKGRD COR BLD-ACNC: 9.93 IU/ML
TB GOLD PLUS: NEGATIVE

## 2023-12-05 PROCEDURE — 1160F PR REVIEW ALL MEDS BY PRESCRIBER/CLIN PHARMACIST DOCUMENTED: ICD-10-PCS | Mod: CPTII,S$GLB,, | Performed by: INTERNAL MEDICINE

## 2023-12-05 PROCEDURE — 3008F BODY MASS INDEX DOCD: CPT | Mod: CPTII,S$GLB,, | Performed by: INTERNAL MEDICINE

## 2023-12-05 PROCEDURE — 99396 PREV VISIT EST AGE 40-64: CPT | Mod: S$GLB,,, | Performed by: INTERNAL MEDICINE

## 2023-12-05 PROCEDURE — 1159F PR MEDICATION LIST DOCUMENTED IN MEDICAL RECORD: ICD-10-PCS | Mod: CPTII,S$GLB,, | Performed by: INTERNAL MEDICINE

## 2023-12-05 PROCEDURE — 3044F PR MOST RECENT HEMOGLOBIN A1C LEVEL <7.0%: ICD-10-PCS | Mod: CPTII,S$GLB,, | Performed by: INTERNAL MEDICINE

## 2023-12-05 PROCEDURE — 3074F SYST BP LT 130 MM HG: CPT | Mod: CPTII,S$GLB,, | Performed by: INTERNAL MEDICINE

## 2023-12-05 PROCEDURE — 99396 PR PREVENTIVE VISIT,EST,40-64: ICD-10-PCS | Mod: S$GLB,,, | Performed by: INTERNAL MEDICINE

## 2023-12-05 PROCEDURE — 3074F PR MOST RECENT SYSTOLIC BLOOD PRESSURE < 130 MM HG: ICD-10-PCS | Mod: CPTII,S$GLB,, | Performed by: INTERNAL MEDICINE

## 2023-12-05 PROCEDURE — 1160F RVW MEDS BY RX/DR IN RCRD: CPT | Mod: CPTII,S$GLB,, | Performed by: INTERNAL MEDICINE

## 2023-12-05 PROCEDURE — 3044F HG A1C LEVEL LT 7.0%: CPT | Mod: CPTII,S$GLB,, | Performed by: INTERNAL MEDICINE

## 2023-12-05 PROCEDURE — 3078F PR MOST RECENT DIASTOLIC BLOOD PRESSURE < 80 MM HG: ICD-10-PCS | Mod: CPTII,S$GLB,, | Performed by: INTERNAL MEDICINE

## 2023-12-05 PROCEDURE — 3078F DIAST BP <80 MM HG: CPT | Mod: CPTII,S$GLB,, | Performed by: INTERNAL MEDICINE

## 2023-12-05 PROCEDURE — 1159F MED LIST DOCD IN RCRD: CPT | Mod: CPTII,S$GLB,, | Performed by: INTERNAL MEDICINE

## 2023-12-05 PROCEDURE — 3008F PR BODY MASS INDEX (BMI) DOCUMENTED: ICD-10-PCS | Mod: CPTII,S$GLB,, | Performed by: INTERNAL MEDICINE

## 2023-12-05 NOTE — PROGRESS NOTES
Subjective:       Patient ID: Rita Grant is a 48 y.o. female.    Medication List with Changes/Refills   Current Medications    BIOTIN 10 MG TAB    once daily.     ERGOCALCIFEROL, VITAMIN D2, (VITAMIN D ORAL)    once daily.     HYDROXYZINE PAMOATE (VISTARIL) 50 MG CAP    1 pill TID for 2 days, any left over Q8 hours PRN bad headache, no driving/causes sedation    INTRAROSA 6.5 MG INST        IPRATROPIUM (ATROVENT) 42 MCG (0.06 %) NASAL SPRAY    2 SPRAYS BY NASAL ROUTE 3 (THREE) TIMES DAILY. 30 MINUTES BEFORE MEALS    MAGNESIUM 250 MG TAB    once daily.     MELATONIN 10 MG CAP    every evening.     MULTIVITAMIN CAPSULE    Take 1 capsule by mouth once daily.    NORTRIPTYLINE (PAMELOR) 10 MG CAPSULE    Take 1 capsule (10 mg total) by mouth every evening.    RED YEAST RICE ORAL    Take by mouth.    RIZATRIPTAN (MAXALT-MLT) 10 MG DISINTEGRATING TABLET    1 melt at onset headache, second melt 2 hours later if needed, no more than 2 uses day/3 days use in week    TRANEXAMIC ACID ORAL        VALACYCLOVIR (VALTREX) 1000 MG TABLET    Take 1,000 mg by mouth 2 (two) times daily as needed.   Discontinued Medications    SCOPOLAMINE (TRANSDERM-SCOP) 1.3-1.5 MG (1 MG OVER 3 DAYS)    Place 1 patch onto the skin every 72 hours.    SEMAGLUTIDE (OZEMPIC) 1 MG/DOSE (4 MG/3 ML)    Inject 1 mg into the skin every 7 days.       Chief Complaint: Annual Exam  She is here today for her annual exam.      She has hyperlipidemia noted on her lipids on 7/2021 were 295/97/88/187. Repeat lipids after weight loss on 112/2023 were 223/70/82/127.       She has history of bypass surgery (Rodriguez--Y) in 2001. She denies any complications since the bypass surgery. Labs on 4/2023 show vitamin B12 760. Vitamin D of 32 on 11/2022. She is taking OTC vitamin D supplementation.      She used ozempic for weight loss but has now been off for 2 weeks.  She has maintained her weight loss. HbA1c was 4.9 on 12/2023.     She was noted to have elevated LFTs on 4/2023  with AST 38 and ALT 38. She has lost weight and her repeat labs on 12/2023 show AST 23 and ALT 27.       She has mixed headaches with tension and migraines. She describes headaches as frontal headaches bilateral that throb. Worse with lights and working on computer. Better with tylenol. They are occasionally associated with nausea but no vomiting. She was seen in headaches clinic and started on nortriptyline 10 mg daily, continued on magnesium daily and given maxalt to use as needed. She is doing much better since starting nortriptyline with only an occasional headache triggered by stress or menstruation.     She was having cold hands with numbness. This has improved since starting nortriptyline.     She has occasional insomnia that responds to hydroxyzine.       She lives with her  and 4 children. She feels safe at home. She exercises regularly. She eats healthy. 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       Fecal kit----3/2023 neg   Mammogram----9/2023 neg  Pap-----1/2020 negative HPV neg  Tdap---10/2017  Influenza vaccine---10/2023  Covid vaccine---1 dose     Review of Systems   Constitutional:  Negative for activity change, appetite change, fatigue, fever and unexpected weight change.   HENT:  Negative for congestion, ear pain, hearing loss, rhinorrhea, sore throat and trouble swallowing.    Eyes:  Negative for pain, discharge and visual disturbance.   Respiratory:  Negative for cough, chest tightness, shortness of breath and wheezing.    Cardiovascular:  Negative for chest pain, palpitations and leg swelling.   Gastrointestinal:  Negative for abdominal pain, blood in stool, constipation, diarrhea, nausea and vomiting.   Endocrine: Negative for polydipsia and polyuria.   Genitourinary:  Negative for difficulty urinating, dysuria, hematuria and menstrual problem.   Musculoskeletal:  Negative for arthralgias, back pain, joint swelling, myalgias and neck  "pain.   Skin:  Negative for rash.   Neurological:  Positive for headaches. Negative for dizziness, weakness and numbness.   Hematological:  Does not bruise/bleed easily.   Psychiatric/Behavioral:  Negative for confusion, dysphoric mood, sleep disturbance and suicidal ideas. The patient is not nervous/anxious.        Objective:      Vitals:    12/05/23 0706   BP: 112/72   Pulse: 90   Resp: 16   Temp: 98.2 °F (36.8 °C)   SpO2: 99%   Weight: 69 kg (152 lb 3.6 oz)   Height: 5' 4" (1.626 m)     Body mass index is 26.13 kg/m².  Physical Exam    General appearance: No acute distress, cooperative  Eyes: PERRL, EOMI, conjunctiva clear  Ears: normal external ear and pinna, tm clear without drainage, canals clear  Nose: Normal mucosa without drainage  Throat: no exudates or erythema, tonsils not enlarged  Mouth: no sores or lesions, moist mucous membranes  Neck: FROM, soft, supple, no thyromegaly, no bruits  Lymph: no anterior or posterior cervical adenopathy  Heart::  Regular rate and rhythm, no murmur  Lung: Clear to ascultation bilaterally, no wheezing, no rales, no rhonchi, no distress  Abdomen: Soft, nontender, no distention, no hepatosplenomegaly, bowel sounds normal, no guarding, no rebound, no peritoneal signs  Skin: no rashes, no lesions  Extremities: no edema, no cyanosis  Neuro: CN 2-12 intact, 5/5 muscle strength upper and lower extremity bilaterally, 2+ DTRs UE and LE bilaterally, normal gait  Peripheral pulses: 2+ pedal pulses bilaterally, good perfusion and color  Musculoskeletal: FROM, good strenth, no tenderness  Joint: normal appearance, no swelling, no warmth, no deformity in all joints    Assessment:       1. Well adult exam    2. Hyperlipidemia, unspecified hyperlipidemia type    3. History of Rodriguez-en-Y gastric bypass    4. Vitamin B 12 deficiency    5. Vitamin D deficiency    6. Mixed headache    7. Cold hands and feet without peripheral vascular disease    8. Primary insomnia        Plan:       Well " adult exam  She is UTD on her labs, fecal kit and mammogram. Vaccines UTD.  Formed completed for school    Hyperlipidemia, unspecified hyperlipidemia type  Improved with weight loss    History of Rodriguez-en-Y gastric bypass with Vitamin B 12 deficiency  She is doing well on oral supplementation    Vitamin D deficiency  Continue supplementation    Mixed headache  Well controlled on nortriptyline and magnesium.  She is doing much better    Cold hands and feet without peripheral vascular disease  Improved on nortriptyline    Primary insomnia  Mild and only uses hydroxyzine occasionally.     Follow up in about 1 year (around 12/5/2024) for annual exam.

## 2023-12-08 ENCOUNTER — PATIENT MESSAGE (OUTPATIENT)
Dept: FAMILY MEDICINE | Facility: CLINIC | Age: 48
End: 2023-12-08
Payer: COMMERCIAL

## 2024-01-02 ENCOUNTER — OFFICE VISIT (OUTPATIENT)
Dept: NEUROLOGY | Facility: CLINIC | Age: 49
End: 2024-01-02
Payer: COMMERCIAL

## 2024-01-02 VITALS
HEART RATE: 78 BPM | WEIGHT: 156.75 LBS | RESPIRATION RATE: 17 BRPM | HEIGHT: 64 IN | BODY MASS INDEX: 26.76 KG/M2 | TEMPERATURE: 97 F | DIASTOLIC BLOOD PRESSURE: 75 MMHG | SYSTOLIC BLOOD PRESSURE: 109 MMHG

## 2024-01-02 DIAGNOSIS — M26.623 BILATERAL TEMPOROMANDIBULAR JOINT PAIN: ICD-10-CM

## 2024-01-02 DIAGNOSIS — G43.009 MIGRAINE WITHOUT AURA AND WITHOUT STATUS MIGRAINOSUS, NOT INTRACTABLE: Primary | ICD-10-CM

## 2024-01-02 PROCEDURE — 1160F RVW MEDS BY RX/DR IN RCRD: CPT | Mod: CPTII,S$GLB,, | Performed by: PHYSICIAN ASSISTANT

## 2024-01-02 PROCEDURE — 3008F BODY MASS INDEX DOCD: CPT | Mod: CPTII,S$GLB,, | Performed by: PHYSICIAN ASSISTANT

## 2024-01-02 PROCEDURE — 99213 OFFICE O/P EST LOW 20 MIN: CPT | Mod: S$GLB,,, | Performed by: PHYSICIAN ASSISTANT

## 2024-01-02 PROCEDURE — 3074F SYST BP LT 130 MM HG: CPT | Mod: CPTII,S$GLB,, | Performed by: PHYSICIAN ASSISTANT

## 2024-01-02 PROCEDURE — 3078F DIAST BP <80 MM HG: CPT | Mod: CPTII,S$GLB,, | Performed by: PHYSICIAN ASSISTANT

## 2024-01-02 PROCEDURE — 1159F MED LIST DOCD IN RCRD: CPT | Mod: CPTII,S$GLB,, | Performed by: PHYSICIAN ASSISTANT

## 2024-01-02 PROCEDURE — 99999 PR PBB SHADOW E&M-EST. PATIENT-LVL IV: CPT | Mod: PBBFAC,,, | Performed by: PHYSICIAN ASSISTANT

## 2024-01-02 RX ORDER — HYDROCODONE BITARTRATE AND ACETAMINOPHEN 10; 325 MG/1; MG/1
1 TABLET ORAL
COMMUNITY

## 2024-01-02 RX ORDER — HYDROXYZINE PAMOATE 50 MG/1
CAPSULE ORAL
Qty: 15 CAPSULE | Refills: 0 | Status: SHIPPED | OUTPATIENT
Start: 2024-01-02

## 2024-01-02 RX ORDER — NORTRIPTYLINE HYDROCHLORIDE 10 MG/1
10 CAPSULE ORAL NIGHTLY
Qty: 30 CAPSULE | Refills: 5 | Status: SHIPPED | OUTPATIENT
Start: 2024-01-02 | End: 2025-01-01

## 2024-01-02 RX ORDER — RIZATRIPTAN BENZOATE 10 MG/1
TABLET, ORALLY DISINTEGRATING ORAL
Qty: 10 TABLET | Refills: 6 | Status: SHIPPED | OUTPATIENT
Start: 2024-01-02

## 2024-01-02 RX ORDER — TIZANIDINE 2 MG/1
TABLET ORAL
Qty: 30 TABLET | Refills: 1 | Status: SHIPPED | OUTPATIENT
Start: 2024-01-02 | End: 2024-01-29

## 2024-01-02 RX ORDER — AMOXICILLIN 500 MG/1
500 TABLET, FILM COATED ORAL 3 TIMES DAILY
COMMUNITY

## 2024-01-02 NOTE — PROGRESS NOTES
[facility]  [address] [facility] [address]  [place] [address]  Phone:[phone]  Fax: [phone]   Follow up visit       Patient Name: Rita Grant  : 1975  MRN:  [id number]  Today: 2024   LOV:  2023  chief complaint: Headache      PCP: Sonja Hewitt DO.       Assessment:   Rita Grant is a 48 y.o. right handed female with a PMHx of: gastric bypass, raynauds, TMJ, HA, and HLD   whom presents her sons   in follow up. Appears to have episodic  migrainous HA. Well controlled on current regimen. Recent dental work and TMJ, likely triggering some HA.          Review:    ICD-10-CM ICD-9-CM   1. Migraine without aura and without status migrainosus, not intractable  G43.009 346.10   2. Bilateral temporomandibular joint pain  M26.623 524.62               Plan:               -if HA change in quality/nature, will get updated imaging study          For HA Prevention:  Pamelor 10 mg Q2h before bed, refills given   For TMJ, wrote for zanaflex, discussed adv effects/dosing, not to use with etoh/not to drive          -wait until she finishes norco for TMJ pain     For HA :  Maxalt refilled     To break up Headaches:  Vistaril refilled             All test results as well as any necessary instructions were reviewed and discussed with patient.    Review:  Orders Placed This Encounter    nortriptyline (PAMELOR) 10 MG capsule    rizatriptan (MAXALT-MLT) 10 MG disintegrating tablet    hydrOXYzine pamoate (VISTARIL) 50 MG Cap    tiZANidine (ZANAFLEX) 2 MG tablet                 Patient to return to PCP/other specialists for all other problems  Patient to continue on all medications as Rx'd   Full office note available for her reference online in secured patient portal   RTO- 2-3 months, she states she will schedule online   The patient indicates understanding of these issues and agrees to the plan.    HPI:   Rita Grant is a 48 y.o.right handed, female  "with a PMHx of: gastric bypass, raynauds, TMJ, HA, and HLD   whom presents with her sons  in follow up for HA.     At last visit: has pamelor, maxalt and vistaril.   Menstural cycle and recent dental surgery ("Broke jaw/tooth from grinding, had it fixed a week ago, but then developed dry socket. Was given tramadol now I am on norco and amoxicillin. Been off the pamelor for past few days").   Mouth pain triggering some HA (also d/t lack of sleep)  No side effects from pamelor historically   Maxalt generally helps, no side effects  Vistatril does help with bad HA   Pain along frontalis when she has a HA   Last HA was yesterday        From previous visit: stopped elavil and started pamelor. Maxalt and vistaril available.   Liking pamelor, no side effects like the elavil   1st bad HA yesterday, possibly d/t sun exposure, maxalt was abortive  Pain along frontalis   Did use vistaril last night, because had some trouble sleeping, no side effects (except for some drowsiness this morning)  Doesn't want to make any medication changes         At last visit: continued elavil, had maxalt and vistaril. Sent message in May 2023 that PCP stopped her elavil d/t hand pain and hair loss (?).   Frontalis (dull)  Daily HA (30 HD/30)  Last >4, up to full day   Triggers bright light/sung light   Felt she was having worsening pain from her raynauds and hair loss (?) from 10 mg of elavil (?)        -feels both have improved, but also notices it is warmer now regarding raynauds  Was on elavil at 10 mg for a few months before noticing the above issues  Felt when was on elavil her HA were absent  Mild HA no, no other concerns   Hasn't had to use maxalt or vistaril       At last visit: started elavil, maxalt written along with a course of vistaril to break up her HA. PT was also ordered.   HA triggered by stress and certain lighting  Elavil helping without side effects  At most 3-4 HD a month, and only 1 migraine since last being seen   Has " "taken a total of 3 maxalts since it was Rx'd and all worked well without side effects  PT helping   Never used the vistaril  Noticed a slight tremor yesterday in RUE ("I was doing some heavy duty cleaning")  Felt it got better  No other new concerns     HA Historically:   HA HPI:  Start:HA since teenager, "I would like to go over options to treat this HA"  History of trauma (no), History of CNS infection (no), History of Stroke (no)  Location:frontalis and behind the eyes, sometimes stabbing pain in the temples (for years when asked), sometimes will be in occiput  Severity: range: 4-8/10  Duration:4+ hours, longer if she didn't take medicine for bad ones   Frequency:12-15 HD/30 (2 of which migrainous)   How many HA types do you have (?):2 (migraines add nausea and photophobia/phonophobia)   Associated factors (bolded positive) WITH HA (or migraine): Nausea, vomiting, photophobia, phonophobia, tinnitus, scalp pain, vision loss, diplopia, scintillations, eye pain, jaw pain, weakness?    Tried:tylenol   Triggers (in bold): stress, lack of sleep (has been using benadryl for years to sleep. "I also have sinus issues, so it helps that too), too much caffeine, too little caffeine, weather change, seasonal change, strong odours, bright lights, sunlight, food, hormonal changes, TMJ   Last HA: yesterday   Positives in bold: Hx of Kidney Stones, asthma, GI bleed, osteoporosis, CAD/MI, CVA/TIA, DM    Imaging on file: no imaging on head   Therapies tried in past: (failures to be marked, if known---why did it fail?)  MgOx  Zofran  Percocet (in her 20s)   Elavil  Maxalt  Vistaril   PT  Pamelor  Tramadol        As aside a few weeks ago was studying and awoke on the table, unclear if she   "Passed out" or fell asleep. States at times some trouble in sleep. "I was getting a HA and when I woke up I was worried I had a stroke because I was confused and had a bad HA. I was also full of drool." Denies Hx of seizure, unclear if she was " dehydrated/missing a meal/ over tired, denies incontinence/tongue biting, states unwitnessed, and no stroke symptoms either (e.g., facial droop, slurred speech, numbness/weakness on one side, etc).     Medication Reconciliation:   Current Outpatient Medications   Medication Sig Dispense Refill    biotin 10 mg Tab once daily.       ergocalciferol, vitamin D2, (VITAMIN D ORAL) once daily.       hydrOXYzine pamoate (VISTARIL) 50 MG Cap 1 pill TID for 2 days, any left over Q8 hours PRN bad headache, no driving/causes sedation 15 capsule 0    INTRAROSA 6.5 mg Inst       ipratropium (ATROVENT) 42 mcg (0.06 %) nasal spray 2 SPRAYS BY NASAL ROUTE 3 (THREE) TIMES DAILY. 30 MINUTES BEFORE MEALS 45 mL 3    magnesium 250 mg Tab once daily.       melatonin 10 mg Cap every evening.       multivitamin capsule Take 1 capsule by mouth once daily.      nortriptyline (PAMELOR) 10 MG capsule Take 1 capsule (10 mg total) by mouth every evening. 30 capsule 5    RED YEAST RICE ORAL Take by mouth.      rizatriptan (MAXALT-MLT) 10 MG disintegrating tablet 1 melt at onset headache, second melt 2 hours later if needed, no more than 2 uses day/3 days use in week 10 tablet 6    TRANEXAMIC ACID ORAL       valACYclovir (VALTREX) 1000 MG tablet Take 1,000 mg by mouth 2 (two) times daily as needed.       No current facility-administered medications for this visit.     Review of patient's allergies indicates:   Allergen Reactions    Bactrim [sulfamethoxazole-trimethoprim] Swelling       PMHx:  Past Medical History:   Diagnosis Date    Anemia     Headache      Past Surgical History:   Procedure Laterality Date    BREAST BIOPSY Right     age 23 benign     SECTION      x 3    GASTRIC BYPASS      Rodriguez n Y       Fhx:  Family History   Problem Relation Age of Onset    Lung cancer Paternal Grandfather         lung    Colon cancer Paternal Grandmother         colon    Heart disease Maternal Grandfather     Hypertension Father     Heart disease  Father     Thyroid nodules Mother     Hypothyroidism Mother     Ovarian cancer Maternal Grandmother     Macular degeneration Neg Hx     Glaucoma Neg Hx     Diabetes Neg Hx        Shx:   Social History     Socioeconomic History    Marital status:    Occupational History    Occupation: nurse    Tobacco Use    Smoking status: Never    Smokeless tobacco: Never   Substance and Sexual Activity    Alcohol use: Yes     Comment: 2x year    Drug use: No    Sexual activity: Yes     Partners: Male     Social Determinants of Health     Financial Resource Strain: Low Risk  (12/4/2023)    Overall Financial Resource Strain (CARDIA)     Difficulty of Paying Living Expenses: Not hard at all   Food Insecurity: No Food Insecurity (12/4/2023)    Hunger Vital Sign     Worried About Running Out of Food in the Last Year: Never true     Ran Out of Food in the Last Year: Never true   Transportation Needs: No Transportation Needs (12/4/2023)    PRAPARE - Transportation     Lack of Transportation (Medical): No     Lack of Transportation (Non-Medical): No   Physical Activity: Insufficiently Active (12/4/2023)    Exercise Vital Sign     Days of Exercise per Week: 3 days     Minutes of Exercise per Session: 30 min   Stress: No Stress Concern Present (12/4/2023)    [employer] [employer] - Occupational Stress Questionnaire     Feeling of Stress : Not at all   Social Connections: Unknown (12/4/2023)    Social Connection and Isolation Panel [NHANES]     Frequency of Communication with Friends and Family: More than three times a week     Frequency of Social Gatherings with Friends and Family: Once a week     Active Member of Clubs or Organizations: No     Attends Club or Organization Meetings: 1 to 4 times per year     Marital Status:    Housing Stability: Low Risk  (12/4/2023)    Housing Stability Vital Sign     Unable to Pay for Housing in the Last Year: No     Number of Places Lived in the Last Year: 1     Unstable  "Housing in the Last Year: No           Labs:   Reviewed in chart     Imaging:   Reviewed in chart       Other testing:  Reviewed in chart     Note: I have independently reviewed any/all imaging/labs/tests and agree with the report (s) as documented.  Any discrepancies will be as noted/demarcated by free text.  STEVIE TAM 1/2/2024                     ROS:   Review Of Systems (questions asked, positive or additions in BOLD)  Gen: Weight change, fatigue/malaise, pyrexia   HEENT: Tinnitus, headache,  blurred vision, eye pain, diplopia, photophobia,  nose bleeds, congestion, sore throat, jaw pain, scalp pain, neck stiffness   Card: Palpitations, CP   Pulm: SOB, cough   Vas: Easy bruising, easy bleeding   GI: N/V/D/C, incontinence, hematemesis, hematochezia    : incontinence, hematuria   Endocrine: Temp intolerance, polyuria, polydipsia   M/S: Neck pain, myalgia, back pain, joint pain, falls    Neuro: PER HPI   PSY: Memory loss, confusion, depression, anxiety, trouble in sleep          EXAM:   /75 (BP Location: Right arm, Patient Position: Sitting, BP Method: Medium (Automatic))   Pulse 78   Temp 97.3 °F (36.3 °C) (Temporal)   Resp 17   Ht 5' 4" (1.626 m)   Wt 71.1 kg (156 lb 12 oz)   LMP 12/04/2023   BMI 26.91 kg/m²     GEN:  NAD  HEENT: NC/AT, crepitus with mouth open/close      EXTREM:   no edema present.    NEURO:  Mental Status:  Awake, alert and appropriately oriented to time, place, and person.  Normal attention and concentration.  Speech is fluent and appropriate language function (I.e., comprehension).      Cranial Nerves:     Extraocular movements are intact and without nystagmus.   Facial movement is symmetric.  Facial sensation is intact.  Hearing is normal. Uvula in midline. DROM of neck in all (6) directions, shoulder shrug symmetrical. Tongue in midline without fasiculation.                Motor: antigravity in all limbs  No drift  No resting tremor      DTR: 2+ bilat patellar     Gait and " Stance: Normal manner of stance and gait function testing.                 This document has been electronically signed by REGGIE Su MPAC on 1/2/2024, I have personally typed this message using the EMR.       Dr Lorenzo MD  was available during today's visit.       CC: Sonja Hewitt,

## 2024-01-05 ENCOUNTER — PATIENT MESSAGE (OUTPATIENT)
Dept: FAMILY MEDICINE | Facility: CLINIC | Age: 49
End: 2024-01-05
Payer: COMMERCIAL

## 2024-01-29 RX ORDER — TIZANIDINE 2 MG/1
TABLET ORAL
Qty: 90 TABLET | Refills: 1 | Status: SHIPPED | OUTPATIENT
Start: 2024-01-29

## 2024-03-18 ENCOUNTER — PATIENT MESSAGE (OUTPATIENT)
Dept: ADMINISTRATIVE | Facility: HOSPITAL | Age: 49
End: 2024-03-18
Payer: COMMERCIAL

## 2024-03-20 ENCOUNTER — PATIENT MESSAGE (OUTPATIENT)
Dept: ADMINISTRATIVE | Facility: HOSPITAL | Age: 49
End: 2024-03-20
Payer: COMMERCIAL

## 2024-03-21 DIAGNOSIS — Z12.11 SCREENING FOR COLON CANCER: ICD-10-CM

## 2024-04-02 ENCOUNTER — OFFICE VISIT (OUTPATIENT)
Dept: NEUROLOGY | Facility: CLINIC | Age: 49
End: 2024-04-02
Payer: COMMERCIAL

## 2024-04-02 VITALS
WEIGHT: 159.81 LBS | SYSTOLIC BLOOD PRESSURE: 116 MMHG | HEART RATE: 81 BPM | TEMPERATURE: 98 F | DIASTOLIC BLOOD PRESSURE: 78 MMHG | RESPIRATION RATE: 17 BRPM | HEIGHT: 64 IN | BODY MASS INDEX: 27.28 KG/M2

## 2024-04-02 DIAGNOSIS — M79.18 MYOFASCIAL PAIN: ICD-10-CM

## 2024-04-02 DIAGNOSIS — M26.623 BILATERAL TEMPOROMANDIBULAR JOINT PAIN: ICD-10-CM

## 2024-04-02 DIAGNOSIS — G43.009 MIGRAINE WITHOUT AURA AND WITHOUT STATUS MIGRAINOSUS, NOT INTRACTABLE: Primary | ICD-10-CM

## 2024-04-02 PROCEDURE — 1159F MED LIST DOCD IN RCRD: CPT | Mod: CPTII,S$GLB,, | Performed by: PHYSICIAN ASSISTANT

## 2024-04-02 PROCEDURE — 99213 OFFICE O/P EST LOW 20 MIN: CPT | Mod: S$GLB,,, | Performed by: PHYSICIAN ASSISTANT

## 2024-04-02 PROCEDURE — 3074F SYST BP LT 130 MM HG: CPT | Mod: CPTII,S$GLB,, | Performed by: PHYSICIAN ASSISTANT

## 2024-04-02 PROCEDURE — 1160F RVW MEDS BY RX/DR IN RCRD: CPT | Mod: CPTII,S$GLB,, | Performed by: PHYSICIAN ASSISTANT

## 2024-04-02 PROCEDURE — 99999 PR PBB SHADOW E&M-EST. PATIENT-LVL IV: CPT | Mod: PBBFAC,,, | Performed by: PHYSICIAN ASSISTANT

## 2024-04-02 PROCEDURE — 3078F DIAST BP <80 MM HG: CPT | Mod: CPTII,S$GLB,, | Performed by: PHYSICIAN ASSISTANT

## 2024-04-02 PROCEDURE — 3008F BODY MASS INDEX DOCD: CPT | Mod: CPTII,S$GLB,, | Performed by: PHYSICIAN ASSISTANT

## 2024-04-02 RX ORDER — HYDROXYZINE PAMOATE 50 MG/1
CAPSULE ORAL
Qty: 15 CAPSULE | Refills: 0 | Status: SHIPPED | OUTPATIENT
Start: 2024-04-02

## 2024-04-02 NOTE — PROGRESS NOTES
[facility]  [address] [facility] [address]  [place] [address]  Phone:[phone]  Fax: [phone]   Follow up visit       Patient Name: iRta Grant  : 1975  MRN:  [id number]  Today: 2024   LOV: 2024  chief complaint: Headache      PCP: Sonja Hewitt DO.       Assessment:   Rita Grant is a 48 y.o. right handed female with a PMHx of: gastric bypass, raynauds, TMJ, HA, and HLD   whom presents solo in follow up. Appears to have episodic  migrainous HA. Well controlled on current regimen. Recent dental work and TMJ, likely triggering some HA.          Review:    ICD-10-CM ICD-9-CM   1. Migraine without aura and without status migrainosus, not intractable  G43.009 346.10   2. Myofascial pain  M79.18 729.1   3. Bilateral temporomandibular joint pain  M26.623 524.62                 Plan:               -if HA change in quality/nature, will get updated imaging study          For HA Prevention:  Pamelor 10 mg Q2h before bed,   Zanaflex PRN evenings (mouth pain)     For HA :  Maxalt      To break up Headaches:  Vistaril refilled         She will research light blocking glasses that she can wear in concert with her contacts.     All test results as well as any necessary instructions were reviewed and discussed with patient.    Review:  Orders Placed This Encounter    hydrOXYzine pamoate (VISTARIL) 50 MG Cap                   Patient to return to PCP/other specialists for all other problems  Patient to continue on all medications as Rx'd   Full office note available for her reference online in secured patient portal   RTO-6 months   The patient indicates understanding of these issues and agrees to the plan.    HPI:   Rita Grant is a 48 y.o.right handed, female with a PMHx of: gastric bypass, raynauds, TMJ, HA, and HLD   whom presents solo  in follow up for HA.     At last visit: pamelor, maxalt, vistaril and zanaflex      Had to use maxalt a  "few times since last visit (need for dental implant in left jaw coming up, potential trigger for HA)  5 HD a month -hormonal related/stress  No side effects from pamelor/maxalt/vistaril/zanaflex and feels this current regimen is working  Last HA was a week ago   +photophobia most days  Historically pain along frontalis   No HA at present    At last visit: has pamelor, maxalt and vistaril.   Menstural cycle and recent dental surgery ("Broke jaw/tooth from grinding, had it fixed a week ago, but then developed dry socket. Was given tramadol now I am on norco and amoxicillin. Been off the pamelor for past few days").   Mouth pain triggering some HA (also d/t lack of sleep)  No side effects from pamelor historically   Maxalt generally helps, no side effects  Vistatril does help with bad HA   Pain along frontalis when she has a HA   Last HA was yesterday        From previous visit: stopped elavil and started pamelor. Maxalt and vistaril available.   Liking pamelor, no side effects like the elavil   1st bad HA yesterday, possibly d/t sun exposure, maxalt was abortive  Pain along frontalis   Did use vistaril last night, because had some trouble sleeping, no side effects (except for some drowsiness this morning)  Doesn't want to make any medication changes         At last visit: continued elavil, had maxalt and vistaril. Sent message in May 2023 that PCP stopped her elavil d/t hand pain and hair loss (?).   Frontalis (dull)  Daily HA (30 HD/30)  Last >4, up to full day   Triggers bright light/sung light   Felt she was having worsening pain from her raynauds and hair loss (?) from 10 mg of elavil (?)        -feels both have improved, but also notices it is warmer now regarding raynauds  Was on elavil at 10 mg for a few months before noticing the above issues  Felt when was on elavil her HA were absent  Mild HA no, no other concerns   Hasn't had to use maxalt or vistaril       At last visit: started elavil, maxalt written " "along with a course of vistaril to break up her HA. PT was also ordered.   HA triggered by stress and certain lighting  Elavil helping without side effects  At most 3-4 HD a month, and only 1 migraine since last being seen   Has taken a total of 3 maxalts since it was Rx'd and all worked well without side effects  PT helping   Never used the vistaril  Noticed a slight tremor yesterday in RUE ("I was doing some heavy duty cleaning")  Felt it got better  No other new concerns     HA Historically:   HA HPI:  Start:HA since teenager, "I would like to go over options to treat this HA"  History of trauma (no), History of CNS infection (no), History of Stroke (no)  Location:frontalis and behind the eyes, sometimes stabbing pain in the temples (for years when asked), sometimes will be in occiput  Severity: range: 4-8/10  Duration:4+ hours, longer if she didn't take medicine for bad ones   Frequency:12-15 HD/30 (2 of which migrainous)   How many HA types do you have (?):2 (migraines add nausea and photophobia/phonophobia)   Associated factors (bolded positive) WITH HA (or migraine): Nausea, vomiting, photophobia, phonophobia, tinnitus, scalp pain, vision loss, diplopia, scintillations, eye pain, jaw pain, weakness?    Tried:tylenol   Triggers (in bold): stress, lack of sleep (has been using benadryl for years to sleep. "I also have sinus issues, so it helps that too), too much caffeine, too little caffeine, weather change, seasonal change, strong odours, bright lights, sunlight, food, hormonal changes, TMJ   Last HA: yesterday   Positives in bold: Hx of Kidney Stones, asthma, GI bleed, osteoporosis, CAD/MI, CVA/TIA, DM    Imaging on file: no imaging on head   Therapies tried in past: (failures to be marked, if known---why did it fail?)  MgOx  Zofran  Percocet (in her 20s)   Elavil  Maxalt  Vistaril   PT  Pamelor  Tramadol        As aside a few weeks ago was studying and awoke on the table, unclear if she   "Passed out" or " "fell asleep. States at times some trouble in sleep. "I was getting a HA and when I woke up I was worried I had a stroke because I was confused and had a bad HA. I was also full of drool." Denies Hx of seizure, unclear if she was dehydrated/missing a meal/ over tired, denies incontinence/tongue biting, states unwitnessed, and no stroke symptoms either (e.g., facial droop, slurred speech, numbness/weakness on one side, etc).     Medication Reconciliation:   Current Outpatient Medications   Medication Sig Dispense Refill    amoxicillin (AMOXIL) 500 MG Tab Take 500 mg by mouth 3 (three) times daily.      biotin 10 mg Tab once daily.       ergocalciferol, vitamin D2, (VITAMIN D ORAL) once daily.       HYDROcodone-acetaminophen (NORCO)  mg per tablet Take 1 tablet by mouth.      hydrOXYzine pamoate (VISTARIL) 50 MG Cap 1 pill TID for 2 days, any left over Q8 hours PRN bad headache, no driving/causes sedation 15 capsule 0    INTRAROSA 6.5 mg Inst       ipratropium (ATROVENT) 42 mcg (0.06 %) nasal spray 2 SPRAYS BY NASAL ROUTE 3 (THREE) TIMES DAILY. 30 MINUTES BEFORE MEALS 45 mL 3    magnesium 250 mg Tab once daily.       melatonin 10 mg Cap every evening.       multivitamin capsule Take 1 capsule by mouth once daily.      nortriptyline (PAMELOR) 10 MG capsule Take 1 capsule (10 mg total) by mouth every evening. 30 capsule 5    RED YEAST RICE ORAL Take by mouth.      rizatriptan (MAXALT-MLT) 10 MG disintegrating tablet 1 melt at onset headache, second melt 2 hours later if needed, no more than 2 uses day/3 days use in week 10 tablet 6    tiZANidine (ZANAFLEX) 2 MG tablet TAKE ONE TABLET BY MOUTH 1 TO 2 HOURS BEFORE BED AS NEEDED FOR TMJ/HEADACHES 90 tablet 1    TRANEXAMIC ACID ORAL       valACYclovir (VALTREX) 1000 MG tablet Take 1,000 mg by mouth 2 (two) times daily as needed.       No current facility-administered medications for this visit.     Review of patient's allergies indicates:   Allergen Reactions    " Bactrim [sulfamethoxazole-trimethoprim] Swelling       PMHx:  Past Medical History:   Diagnosis Date    Anemia     Headache      Past Surgical History:   Procedure Laterality Date    BREAST BIOPSY Right     age 23 benign     SECTION      x 3    GASTRIC BYPASS      Rodriguez n Y       Fhx:  Family History   Problem Relation Age of Onset    Lung cancer Paternal Grandfather         lung    Colon cancer Paternal Grandmother         colon    Heart disease Maternal Grandfather     Hypertension Father     Heart disease Father     Thyroid nodules Mother     Hypothyroidism Mother     Ovarian cancer Maternal Grandmother     Macular degeneration Neg Hx     Glaucoma Neg Hx     Diabetes Neg Hx        Shx:   Social History     Socioeconomic History    Marital status:    Occupational History    Occupation: nurse    Tobacco Use    Smoking status: Never    Smokeless tobacco: Never   Substance and Sexual Activity    Alcohol use: Yes     Comment: 2x year    Drug use: No    Sexual activity: Yes     Partners: Male     Social Determinants of Health     Financial Resource Strain: Low Risk  (2023)    Overall Financial Resource Strain (CARDIA)     Difficulty of Paying Living Expenses: Not hard at all   Food Insecurity: No Food Insecurity (2023)    Hunger Vital Sign     Worried About Running Out of Food in the Last Year: Never true     Ran Out of Food in the Last Year: Never true   Transportation Needs: No Transportation Needs (2023)    PRAPARE - Transportation     Lack of Transportation (Medical): No     Lack of Transportation (Non-Medical): No   Physical Activity: Insufficiently Active (2023)    Exercise Vital Sign     Days of Exercise per Week: 3 days     Minutes of Exercise per Session: 30 min   Stress: No Stress Concern Present (2023)    [employer] [place] [employer] - Occupational Stress Questionnaire     Feeling of Stress : Not at all   Social Connections: Unknown (2023)    Social  "Connection and Isolation Panel [NHANES]     Frequency of Communication with Friends and Family: More than three times a week     Frequency of Social Gatherings with Friends and Family: Once a week     Active Member of Clubs or Organizations: No     Attends Club or Organization Meetings: 1 to 4 times per year     Marital Status:    Housing Stability: Low Risk  (12/4/2023)    Housing Stability Vital Sign     Unable to Pay for Housing in the Last Year: No     Number of Places Lived in the Last Year: 1     Unstable Housing in the Last Year: No           Labs:   Reviewed in chart     Imaging:   Reviewed in chart       Other testing:  Reviewed in chart     Note: I have independently reviewed any/all imaging/labs/tests and agree with the report (s) as documented.  Any discrepancies will be as noted/demarcated by free text.  STEVIE TAM 4/2/2024                     ROS:   Review Of Systems (questions asked, positive or additions in BOLD)  Gen: Weight change, fatigue/malaise, pyrexia   HEENT: Tinnitus, headache,  blurred vision, eye pain, diplopia, photophobia,  nose bleeds, congestion, sore throat, jaw pain, scalp pain, neck stiffness   Card: Palpitations, CP   Pulm: SOB, cough   Vas: Easy bruising, easy bleeding   GI: N/V/D/C, incontinence, hematemesis, hematochezia    : incontinence, hematuria   Endocrine: Temp intolerance, polyuria, polydipsia   M/S: Neck pain, myalgia, back pain, joint pain, falls    Neuro: PER HPI   PSY: Memory loss, confusion, depression, anxiety, trouble in sleep          EXAM:   /78 (BP Location: Right arm, Patient Position: Sitting, BP Method: Medium (Automatic))   Pulse 81   Temp 97.8 °F (36.6 °C)   Resp 17   Ht 5' 4" (1.626 m)   Wt 72.5 kg (159 lb 13.3 oz)   LMP 12/27/2023 (Approximate)   BMI 27.44 kg/m²     GEN:  NAD  HEENT: NC/AT      EXTREM:   no edema present.    NEURO:  Mental Status:  Awake, alert and appropriately oriented to time, place, and person.  Normal attention " and concentration.  Speech is fluent and appropriate language function (I.e., comprehension).      Cranial Nerves:     Extraocular movements are intact and without nystagmus.   Facial movement is symmetric.   Hearing is normal. Uvula in midline. DROM of neck in all (6) directions, shoulder shrug symmetrical. Tongue in midline without fasiculation.                Motor: antigravity in all limbs  No drift  No resting tremor      DTR: 2+ bilat patellar     Gait and Stance: Normal manner of stance and gait function testing.                 This document has been electronically signed by Mr. Dwight Loyola MPA, PA-C on 4/2/2024, I have personally typed this message using the EMR.       Dr Lorenzo MD  was available during today's visit.       CC: Sonja Hewitt, DO

## 2024-04-08 ENCOUNTER — OFFICE VISIT (OUTPATIENT)
Dept: OBSTETRICS AND GYNECOLOGY | Facility: CLINIC | Age: 49
End: 2024-04-08
Payer: COMMERCIAL

## 2024-04-08 VITALS
DIASTOLIC BLOOD PRESSURE: 74 MMHG | WEIGHT: 160.69 LBS | HEIGHT: 64 IN | BODY MASS INDEX: 27.43 KG/M2 | SYSTOLIC BLOOD PRESSURE: 112 MMHG

## 2024-04-08 DIAGNOSIS — N81.11 CYSTOCELE, MIDLINE: Primary | ICD-10-CM

## 2024-04-08 PROCEDURE — 99203 OFFICE O/P NEW LOW 30 MIN: CPT | Mod: S$GLB,,, | Performed by: SPECIALIST

## 2024-04-08 PROCEDURE — 3078F DIAST BP <80 MM HG: CPT | Mod: CPTII,S$GLB,, | Performed by: SPECIALIST

## 2024-04-08 PROCEDURE — 3008F BODY MASS INDEX DOCD: CPT | Mod: CPTII,S$GLB,, | Performed by: SPECIALIST

## 2024-04-08 PROCEDURE — 3074F SYST BP LT 130 MM HG: CPT | Mod: CPTII,S$GLB,, | Performed by: SPECIALIST

## 2024-04-08 PROCEDURE — 99999 PR PBB SHADOW E&M-EST. PATIENT-LVL IV: CPT | Mod: PBBFAC,,, | Performed by: SPECIALIST

## 2024-04-08 PROCEDURE — 1159F MED LIST DOCD IN RCRD: CPT | Mod: CPTII,S$GLB,, | Performed by: SPECIALIST

## 2024-04-08 NOTE — PROGRESS NOTES
"49 yo WF  referred Dr Leam rfor possible POP  Pt complains subjective change in vaginal sensation and "feeling something present" on occasion  Pt denies dyspaurenia, dysuria, frequent UTIS. Denies ROVERTO, d/c  Past Medical History:   Diagnosis Date    Anemia     Headache        Past Surgical History:   Procedure Laterality Date    BREAST BIOPSY Right     age 23 benign     SECTION      x 3    GASTRIC BYPASS      Rodriguez n Y       Family History   Problem Relation Age of Onset    Lung cancer Paternal Grandfather         lung    Colon cancer Paternal Grandmother         colon    Ovarian cancer Maternal Grandmother     Heart disease Maternal Grandfather     Hypertension Father     Heart disease Father     Thyroid nodules Mother     Hypothyroidism Mother     Macular degeneration Neg Hx     Glaucoma Neg Hx     Diabetes Neg Hx     Breast cancer Neg Hx        Social History     Socioeconomic History    Marital status:    Occupational History    Occupation: nurse    Tobacco Use    Smoking status: Never    Smokeless tobacco: Never   Substance and Sexual Activity    Alcohol use: Yes     Comment: 2x year    Drug use: No    Sexual activity: Yes     Partners: Male     Birth control/protection: None, Partner-Vasectomy     Social Determinants of Health     Financial Resource Strain: Low Risk  (2023)    Overall Financial Resource Strain (CARDIA)     Difficulty of Paying Living Expenses: Not hard at all   Food Insecurity: No Food Insecurity (2023)    Hunger Vital Sign     Worried About Running Out of Food in the Last Year: Never true     Ran Out of Food in the Last Year: Never true   Transportation Needs: No Transportation Needs (2023)    PRAPARE - Transportation     Lack of Transportation (Medical): No     Lack of Transportation (Non-Medical): No   Physical Activity: Insufficiently Active (2023)    Exercise Vital Sign     Days of Exercise per Week: 3 days     Minutes of Exercise per Session: 30 " min   Stress: No Stress Concern Present (12/4/2023)    [employer] - Occupational Stress Questionnaire     Feeling of Stress : Not at all   Social Connections: Unknown (12/4/2023)    Social Connection and Isolation Panel [NHANES]     Frequency of Communication with Friends and Family: More than three times a week     Frequency of Social Gatherings with Friends and Family: Once a week     Active Member of Clubs or Organizations: No     Attends Club or Organization Meetings: 1 to 4 times per year     Marital Status:    Housing Stability: Low Risk  (12/4/2023)    Housing Stability Vital Sign     Unable to Pay for Housing in the Last Year: No     Number of Places Lived in the Last Year: 1     Unstable Housing in the Last Year: No       Current Outpatient Medications   Medication Sig Dispense Refill    amoxicillin (AMOXIL) 500 MG Tab Take 500 mg by mouth 3 (three) times daily.      biotin 10 mg Tab once daily.       ergocalciferol, vitamin D2, (VITAMIN D ORAL) once daily.       HYDROcodone-acetaminophen (NORCO)  mg per tablet Take 1 tablet by mouth.      hydrOXYzine pamoate (VISTARIL) 50 MG Cap 1 pill TID for 2 days, any left over Q8 hours PRN bad headache, no driving/causes sedation 15 capsule 0    INTRAROSA 6.5 mg Inst       ipratropium (ATROVENT) 42 mcg (0.06 %) nasal spray 2 SPRAYS BY NASAL ROUTE 3 (THREE) TIMES DAILY. 30 MINUTES BEFORE MEALS 45 mL 3    magnesium 250 mg Tab once daily.       melatonin 10 mg Cap every evening.       multivitamin capsule Take 1 capsule by mouth once daily.      nortriptyline (PAMELOR) 10 MG capsule Take 1 capsule (10 mg total) by mouth every evening. 30 capsule 5    RED YEAST RICE ORAL Take by mouth.      rizatriptan (MAXALT-MLT) 10 MG disintegrating tablet 1 melt at onset headache, second melt 2 hours later if needed, no more than 2 uses day/3 days use in week 10 tablet 6    tiZANidine (ZANAFLEX) 2 MG tablet TAKE ONE TABLET BY MOUTH 1 TO 2  HOURS BEFORE BED AS NEEDED FOR TMJ/HEADACHES 90 tablet 1    TRANEXAMIC ACID ORAL       valACYclovir (VALTREX) 1000 MG tablet Take 1,000 mg by mouth 2 (two) times daily as needed.       No current facility-administered medications for this visit.       Review of patient's allergies indicates:   Allergen Reactions    Bactrim [sulfamethoxazole-trimethoprim] Swelling       Review of System:   General: no chills, fever, night sweats, weight gain or weight loss  Psychological: no depression or suicidal ideation  Breasts: no new or changing breast lumps, nipple discharge or masses.  Respiratory: no cough, shortness of breath, or wheezing  Cardiovascular: no chest pain or dyspnea on exertion  Gastrointestinal: no abdominal pain, change in bowel habits, or black or bloody stools  Genito-Urinary: no incontinence, urinary frequency/urgency or pelvic pain or abnormal vaginal bleeding.  Musculoskeletal: no gait disturbance or muscular weakness                                               General Appearance    A and O x 4, Cooperative, no distress   Breasts    Abdomen   deferred  Soft, non-tender, bowel sounds active all four quadrants,  no masses, no organomegaly    Genitourinary:   External rectal exam shows no thrombosed external hemorrhoids.   Pelvic exam was performed with patient supine.  No labial fusion.  There is no rash, lesion or injury on the right labia.  There is no rash, lesion or injury on the left labia.  No bleeding and no signs of injury around the vaginal introitus, urethra is without lesions and well supported. The cervix is visualized with no discharge, lesions or friability.  No vaginal discharge found.  Grade 1 midline  Cystocele,No  Enterocele or rectocele, and uterus well supported.  Bimanual exam:  The urethra is normal to palpation and there are no palpable vaginal wall masses.  Uterus is not deviated, not enlarged, not fixed, normal shape and not tender.  Cervix exhibits no motion tenderness.   Right  adnexum displays no mass and no tenderness.  Left adnexum displays no mass and no tenderness.   Extremities: Extremities normal, atraumatic, no cyanosis or edema                     NOTE  NURSING PERSONAL PRESENT FOR ENTIRE PHYSICAL EXAM     I discussed mild cystocele but no appreciable uterine prolapse    Since pt is minimally symptomatic and ROVERTO nor dyspareunia, I do not rec surgical intervention at this point and rec daily Kegels as prevenative only now  I discussed potential for progression but impossible to predict  aNSWERED ALL QUESTIONS AND WILL FOLLOW  pT TO CONTACT ME IF CHANGE IS S/S    I spent a total of 30 minutes on the day of the visit. This includes face to face time and non-face to face time preparing to see the patient (eg, review of tests), obtaining and/or reviewing separately obtained history, documenting clinical information in the electronic or other health record, independently interpreting results and communicating results to the patient/family/caregiver, or care coordinator.

## 2024-04-10 LAB — HEMOCCULT STL QL IA: NEGATIVE

## 2024-04-15 ENCOUNTER — PATIENT MESSAGE (OUTPATIENT)
Dept: FAMILY MEDICINE | Facility: CLINIC | Age: 49
End: 2024-04-15

## 2024-04-15 ENCOUNTER — OFFICE VISIT (OUTPATIENT)
Dept: FAMILY MEDICINE | Facility: CLINIC | Age: 49
End: 2024-04-15
Payer: COMMERCIAL

## 2024-04-15 VITALS
SYSTOLIC BLOOD PRESSURE: 120 MMHG | HEIGHT: 64 IN | OXYGEN SATURATION: 99 % | HEART RATE: 81 BPM | TEMPERATURE: 98 F | BODY MASS INDEX: 27.48 KG/M2 | WEIGHT: 160.94 LBS | DIASTOLIC BLOOD PRESSURE: 90 MMHG | RESPIRATION RATE: 18 BRPM

## 2024-04-15 DIAGNOSIS — E66.3 OVERWEIGHT (BMI 25.0-29.9): Primary | ICD-10-CM

## 2024-04-15 PROCEDURE — 99213 OFFICE O/P EST LOW 20 MIN: CPT | Mod: S$GLB,,, | Performed by: INTERNAL MEDICINE

## 2024-04-15 PROCEDURE — 3074F SYST BP LT 130 MM HG: CPT | Mod: CPTII,S$GLB,, | Performed by: INTERNAL MEDICINE

## 2024-04-15 PROCEDURE — 1159F MED LIST DOCD IN RCRD: CPT | Mod: CPTII,S$GLB,, | Performed by: INTERNAL MEDICINE

## 2024-04-15 PROCEDURE — 3080F DIAST BP >= 90 MM HG: CPT | Mod: CPTII,S$GLB,, | Performed by: INTERNAL MEDICINE

## 2024-04-15 PROCEDURE — 3008F BODY MASS INDEX DOCD: CPT | Mod: CPTII,S$GLB,, | Performed by: INTERNAL MEDICINE

## 2024-04-15 PROCEDURE — 1160F RVW MEDS BY RX/DR IN RCRD: CPT | Mod: CPTII,S$GLB,, | Performed by: INTERNAL MEDICINE

## 2024-04-15 RX ORDER — SEMAGLUTIDE 0.25 MG/.5ML
0.25 INJECTION, SOLUTION SUBCUTANEOUS
Qty: 4 EACH | Refills: 6 | Status: SHIPPED | OUTPATIENT
Start: 2024-04-15

## 2024-04-15 RX ORDER — TIRZEPATIDE 2.5 MG/.5ML
2.5 INJECTION, SOLUTION SUBCUTANEOUS
Qty: 4 PEN | Refills: 6 | Status: SHIPPED | OUTPATIENT
Start: 2024-04-15 | End: 2024-04-15

## 2024-04-15 NOTE — PROGRESS NOTES
Subjective:       Patient ID: Rita Grant is a 48 y.o. female.    Medication List with Changes/Refills   New Medications    SEMAGLUTIDE, WEIGHT LOSS, (WEGOVY) 0.25 MG/0.5 ML PNIJ    Inject 0.25 mg into the skin every 7 days.   Current Medications    AMOXICILLIN (AMOXIL) 500 MG TAB    Take 500 mg by mouth 3 (three) times daily.    BIOTIN 10 MG TAB    once daily.     ERGOCALCIFEROL, VITAMIN D2, (VITAMIN D ORAL)    once daily.     HYDROCODONE-ACETAMINOPHEN (NORCO)  MG PER TABLET    Take 1 tablet by mouth.    HYDROXYZINE PAMOATE (VISTARIL) 50 MG CAP    1 pill TID for 2 days, any left over Q8 hours PRN bad headache, no driving/causes sedation    INTRAROSA 6.5 MG INST        IPRATROPIUM (ATROVENT) 42 MCG (0.06 %) NASAL SPRAY    2 SPRAYS BY NASAL ROUTE 3 (THREE) TIMES DAILY. 30 MINUTES BEFORE MEALS    MAGNESIUM 250 MG TAB    once daily.     MELATONIN 10 MG CAP    every evening.     MULTIVITAMIN CAPSULE    Take 1 capsule by mouth once daily.    NORTRIPTYLINE (PAMELOR) 10 MG CAPSULE    Take 1 capsule (10 mg total) by mouth every evening.    RED YEAST RICE ORAL    Take by mouth.    RIZATRIPTAN (MAXALT-MLT) 10 MG DISINTEGRATING TABLET    1 melt at onset headache, second melt 2 hours later if needed, no more than 2 uses day/3 days use in week    TIZANIDINE (ZANAFLEX) 2 MG TABLET    TAKE ONE TABLET BY MOUTH 1 TO 2 HOURS BEFORE BED AS NEEDED FOR TMJ/HEADACHES    TRANEXAMIC ACID ORAL        VALACYCLOVIR (VALTREX) 1000 MG TABLET    Take 1,000 mg by mouth 2 (two) times daily as needed.       Chief Complaint: Weight Gain  She presents today with concerns for weight gain. 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  She has been unable to exercise. She is trying to eat healthy. She was on ozempic in the past and did well. She would like to restart treatment.     Review of Systems   Constitutional:  Positive for unexpected  "weight change. Negative for activity change, appetite change, chills, fatigue and fever.   HENT:  Negative for congestion, ear discharge, ear pain, hearing loss, mouth sores, postnasal drip, rhinorrhea, sinus pressure, sore throat and trouble swallowing.    Eyes:  Negative for pain, discharge, redness and visual disturbance.   Respiratory:  Negative for cough, chest tightness, shortness of breath and wheezing.    Cardiovascular:  Negative for chest pain and palpitations.   Gastrointestinal:  Negative for abdominal pain, blood in stool, constipation, diarrhea, nausea and vomiting.   Endocrine: Negative for polydipsia and polyuria.   Genitourinary:  Negative for difficulty urinating, dysuria, hematuria and menstrual problem.   Musculoskeletal:  Negative for arthralgias, joint swelling, neck pain and neck stiffness.   Skin:  Negative for rash.   Neurological:  Negative for weakness and headaches.   Hematological:  Negative for adenopathy.   Psychiatric/Behavioral:  Negative for confusion and dysphoric mood.        Objective:      Vitals:    04/15/24 0908   BP: (!) 120/90   BP Location: Right arm   Patient Position: Sitting   BP Method: Medium (Manual)   Pulse: 81   Resp: 18   Temp: 97.7 °F (36.5 °C)   SpO2: 99%   Weight: 73 kg (160 lb 15 oz)   Height: 5' 4" (1.626 m)     Body mass index is 27.62 kg/m².  Physical Exam    General appearance: No acute distress, cooperative  Neck: FROM, soft, supple, no thyromegaly  Lymph: no anterior or posterior cervical adenopathy  Heart::  Regular rate and rhythm, no murmur  Lung: Clear to ascultation bilaterally, no wheezing, no rales, no rhonchi, no distress  Abdomen: Soft, nontender, no distention, no hepatosplenomegaly, bowel sounds normal, no guarding, no rebound, no peritoneal signs  Skin: no rashes, no lesions  Extremities: no edema, no cyanosis  Neuro: no focal abnormalities, strength 5/5 b/l UE and LE, 2+ DTRs b/l UE and LE, normal gait  Peripheral pulses: 2+ pedal pulses " bilaterally, good perfusion and color  Musculoskeletal: FROM, good strenth, no tenderness  Joint: normal appearance, no swelling, no warmth, no deformity in all joints    Assessment:       1. Overweight (BMI 25.0-29.9)        Plan:       Overweight (BMI 25.0-29.9)  She would benefit from weight loss. She did well on ozempic in the past. She feels the mounjaro will be covered. Will send to the pharm but expressed that insurance has not been covering medication for weight loss. Encouraged healthy eating and weight loss.   -     tirzepatide (MOUNJARO) 2.5 mg/0.5 mL PnIj; Inject 2.5 mg into the skin every 7 days.  Dispense: 4 Pen; Refill: 6    Follow up if symptoms worsen or fail to improve.

## 2024-07-15 RX ORDER — NORTRIPTYLINE HYDROCHLORIDE 10 MG/1
10 CAPSULE ORAL NIGHTLY
Qty: 90 CAPSULE | Refills: 1 | Status: SHIPPED | OUTPATIENT
Start: 2024-07-15

## 2024-07-15 RX ORDER — TIZANIDINE 2 MG/1
TABLET ORAL
Qty: 90 TABLET | Refills: 1 | Status: SHIPPED | OUTPATIENT
Start: 2024-07-15

## 2024-08-16 RX ORDER — HYDROXYZINE PAMOATE 50 MG/1
CAPSULE ORAL
Qty: 15 CAPSULE | Refills: 0 | Status: SHIPPED | OUTPATIENT
Start: 2024-08-16

## 2024-08-28 ENCOUNTER — OFFICE VISIT (OUTPATIENT)
Dept: NEUROLOGY | Facility: CLINIC | Age: 49
End: 2024-08-28
Payer: COMMERCIAL

## 2024-08-28 VITALS
TEMPERATURE: 97 F | WEIGHT: 161.94 LBS | HEART RATE: 81 BPM | RESPIRATION RATE: 17 BRPM | BODY MASS INDEX: 27.64 KG/M2 | DIASTOLIC BLOOD PRESSURE: 76 MMHG | SYSTOLIC BLOOD PRESSURE: 113 MMHG | HEIGHT: 64 IN

## 2024-08-28 DIAGNOSIS — R51.9 FACIAL PAIN: ICD-10-CM

## 2024-08-28 DIAGNOSIS — G43.009 MIGRAINE WITHOUT AURA AND WITHOUT STATUS MIGRAINOSUS, NOT INTRACTABLE: Primary | ICD-10-CM

## 2024-08-28 PROCEDURE — 1160F RVW MEDS BY RX/DR IN RCRD: CPT | Mod: CPTII,S$GLB,, | Performed by: PHYSICIAN ASSISTANT

## 2024-08-28 PROCEDURE — 3008F BODY MASS INDEX DOCD: CPT | Mod: CPTII,S$GLB,, | Performed by: PHYSICIAN ASSISTANT

## 2024-08-28 PROCEDURE — 1159F MED LIST DOCD IN RCRD: CPT | Mod: CPTII,S$GLB,, | Performed by: PHYSICIAN ASSISTANT

## 2024-08-28 PROCEDURE — 3078F DIAST BP <80 MM HG: CPT | Mod: CPTII,S$GLB,, | Performed by: PHYSICIAN ASSISTANT

## 2024-08-28 PROCEDURE — 99214 OFFICE O/P EST MOD 30 MIN: CPT | Mod: S$GLB,,, | Performed by: PHYSICIAN ASSISTANT

## 2024-08-28 PROCEDURE — 99999 PR PBB SHADOW E&M-EST. PATIENT-LVL IV: CPT | Mod: PBBFAC,,, | Performed by: PHYSICIAN ASSISTANT

## 2024-08-28 PROCEDURE — 3074F SYST BP LT 130 MM HG: CPT | Mod: CPTII,S$GLB,, | Performed by: PHYSICIAN ASSISTANT

## 2024-08-28 RX ORDER — TIZANIDINE 2 MG/1
TABLET ORAL
Qty: 90 TABLET | Refills: 1 | Status: SHIPPED | OUTPATIENT
Start: 2024-08-28

## 2024-08-28 RX ORDER — BACLOFEN 10 MG/1
TABLET ORAL
Qty: 30 TABLET | Refills: 0 | Status: SHIPPED | OUTPATIENT
Start: 2024-08-28

## 2024-08-28 RX ORDER — NORTRIPTYLINE HYDROCHLORIDE 10 MG/1
10 CAPSULE ORAL NIGHTLY
Qty: 90 CAPSULE | Refills: 1 | Status: SHIPPED | OUTPATIENT
Start: 2024-08-28

## 2024-08-28 RX ORDER — RIZATRIPTAN BENZOATE 10 MG/1
TABLET, ORALLY DISINTEGRATING ORAL
Qty: 10 TABLET | Refills: 6 | Status: SHIPPED | OUTPATIENT
Start: 2024-08-28

## 2024-08-28 RX ORDER — HYDROXYZINE PAMOATE 50 MG/1
CAPSULE ORAL
Qty: 15 CAPSULE | Refills: 0 | Status: SHIPPED | OUTPATIENT
Start: 2024-08-28

## 2024-09-21 NOTE — PROGRESS NOTES
[facility]  [address] [facility] [address]  [place] [address]  Phone:[phone]  Fax: [phone]   Follow up visit       Patient Name: Rita Grant  : 1975  MRN:  [id number]  Today: 2024   LOV: 2024  chief complaint: Headache      PCP: Sonja Hewitt DO.       Assessment:   Rita Grant is a 49 y.o. right handed female with a PMHx of: gastric bypass, raynauds, TMJ, HA, and HLD   whom presents solo in follow up. Appears to have episodic  migrainous HA. Well controlled on current regimen. Facial pain appearing to more trigeminal in nature (noted hx of HZ in LEFT high forehead). Will treat as if trigeminal neuralgia, but if she develops any rashes on facie, should get an eye exam immediately. She voiced agreement.          Review:    ICD-10-CM ICD-9-CM   1. Migraine without aura and without status migrainosus, not intractable  G43.009 346.10   2. Facial pain  R51.9 784.0                   Plan:               -if HA change in quality/nature, will get updated imaging study          For HA Prevention:  Pamelor 10 mg Q2h before bed, refilled   Zanaflex PRN evenings (mouth pain) -refilled  If having nose pain, stop zanaflex, try baclofen TID (used for trigeminal neuralgia) until pain quells, no use with etoh, may make her tired (if so, no driving)     For HA :  Maxalt  refilled     To break up Headaches:  Vistaril refilled              All test results as well as any necessary instructions were reviewed and discussed with patient.    Review:  Orders Placed This Encounter    baclofen (LIORESAL) 10 MG tablet    tiZANidine (ZANAFLEX) 2 MG tablet    rizatriptan (MAXALT-MLT) 10 MG disintegrating tablet    nortriptyline (PAMELOR) 10 MG capsule    hydrOXYzine pamoate (VISTARIL) 50 MG Cap                     Patient to return to PCP/other specialists for all other problems  Patient to continue on all medications as Rx'd   Full office note available for her  "reference online in secured patient portal   RTO-6 months   The patient indicates understanding of these issues and agrees to the plan.    HPI:   Rita Grant is a 49 y.o.right handed, female with a PMHx of: gastric bypass, raynauds, TMJ, HA, and HLD   whom presents solo  in follow up for HA.     At last visit: pamelor, maxalt, vistaril and zanaflex   More HA since going back to work in June 2024, July was "a  bad month" but this month has been doing well   Having to take maxalt more often past few months   Tried going up on pamelor at 20 mg and felt it caused her hands to hurt   3-4 HD in past month   Hasn't take to take her abortive in past month   Hasn't had to use zanaflex or vistaril  Been under more stress  States she had shingles in past (high left forehead, didn't involve eye)  Has noticed in past few years, some pain starting in her left tip of nose/nostril and can run down into her upper teeth  Has seen dentist  No rash that she has observed  Rarely using zanaflex   Touch makes it worse  Like an "electrical shock" when it occurs, lasting a few seconds, multiple times a day, which can go on for a week or two then stop spontaneously   Currently experiencing in office  States her sleep and stress has been elevated lately, no pyrexia     From previous visit: pamelor, maxalt, vistaril and zanaflex      Had to use maxalt a few times since last visit (need for dental implant in left jaw coming up, potential trigger for HA)  5 HD a month -hormonal related/stress  No side effects from pamelor/maxalt/vistaril/zanaflex and feels this current regimen is working  Last HA was a week ago   +photophobia most days  Historically pain along frontalis   No HA at present    At last visit: has pamelor, maxalt and vistaril.   Menstural cycle and recent dental surgery ("Broke jaw/tooth from grinding, had it fixed a week ago, but then developed dry socket. Was given tramadol now I am on norco and amoxicillin. Been off " "the pamelor for past few days").   Mouth pain triggering some HA (also d/t lack of sleep)  No side effects from pamelor historically   Maxalt generally helps, no side effects  Vistatril does help with bad HA   Pain along frontalis when she has a HA   Last HA was yesterday        From previous visit: stopped elavil and started pamelor. Maxalt and vistaril available.   Liking pamelor, no side effects like the elavil   1st bad HA yesterday, possibly d/t sun exposure, maxalt was abortive  Pain along frontalis   Did use vistaril last night, because had some trouble sleeping, no side effects (except for some drowsiness this morning)  Doesn't want to make any medication changes         At last visit: continued elavil, had maxalt and vistaril. Sent message in May 2023 that PCP stopped her elavil d/t hand pain and hair loss (?).   Frontalis (dull)  Daily HA (30 HD/30)  Last >4, up to full day   Triggers bright light/sung light   Felt she was having worsening pain from her raynauds and hair loss (?) from 10 mg of elavil (?)        -feels both have improved, but also notices it is warmer now regarding raynauds  Was on elavil at 10 mg for a few months before noticing the above issues  Felt when was on elavil her HA were absent  Mild HA no, no other concerns   Hasn't had to use maxalt or vistaril       At last visit: started elavil, maxalt written along with a course of vistaril to break up her HA. PT was also ordered.   HA triggered by stress and certain lighting  Elavil helping without side effects  At most 3-4 HD a month, and only 1 migraine since last being seen   Has taken a total of 3 maxalts since it was Rx'd and all worked well without side effects  PT helping   Never used the vistaril  Noticed a slight tremor yesterday in RUE ("I was doing some heavy duty cleaning")  Felt it got better  No other new concerns     HA Historically:   HA HPI:  Start:HA since teenager, "I would like to go over options to treat this " "HA"  History of trauma (no), History of CNS infection (no), History of Stroke (no)  Location:frontalis and behind the eyes, sometimes stabbing pain in the temples (for years when asked), sometimes will be in occiput  Severity: range: 4-8/10  Duration:4+ hours, longer if she didn't take medicine for bad ones   Frequency:12-15 HD/30 (2 of which migrainous)   How many HA types do you have (?):2 (migraines add nausea and photophobia/phonophobia)   Associated factors (bolded positive) WITH HA (or migraine): Nausea, vomiting, photophobia, phonophobia, tinnitus, scalp pain, vision loss, diplopia, scintillations, eye pain, jaw pain, weakness?    Tried:tylenol   Triggers (in bold): stress, lack of sleep (has been using benadryl for years to sleep. "I also have sinus issues, so it helps that too), too much caffeine, too little caffeine, weather change, seasonal change, strong odours, bright lights, sunlight, food, hormonal changes, TMJ   Last HA: yesterday   Positives in bold: Hx of Kidney Stones, asthma, GI bleed, osteoporosis, CAD/MI, CVA/TIA, DM    Imaging on file: no imaging on head   Therapies tried in past: (failures to be marked, if known---why did it fail?)  MgOx  Zofran  Percocet (in her 20s)   Elavil  Maxalt  Vistaril   PT  Pamelor  Tramadol        As aside a few weeks ago was studying and awoke on the table, unclear if she   "Passed out" or fell asleep. States at times some trouble in sleep. "I was getting a HA and when I woke up I was worried I had a stroke because I was confused and had a bad HA. I was also full of drool." Denies Hx of seizure, unclear if she was dehydrated/missing a meal/ over tired, denies incontinence/tongue biting, states unwitnessed, and no stroke symptoms either (e.g., facial droop, slurred speech, numbness/weakness on one side, etc).     Medication Reconciliation:   Current Outpatient Medications   Medication Sig Dispense Refill    amoxicillin (AMOXIL) 500 MG Tab Take 500 mg by mouth 3 " (three) times daily. (Patient not taking: Reported on 4/15/2024)      biotin 10 mg Tab once daily.       ergocalciferol, vitamin D2, (VITAMIN D ORAL) once daily.       HYDROcodone-acetaminophen (NORCO)  mg per tablet Take 1 tablet by mouth. (Patient not taking: Reported on 4/15/2024)      hydrOXYzine pamoate (VISTARIL) 50 MG Cap TAKE 1 CAPSULE BY MOUTH 3 TIMES A DAY X 2 DAYS ANY LEFT OVER EVERY 8 HOURS AS NEEDED FOR BAD HEADACHE NO DRIVING CAUSES SEDATION 15 capsule 0    INTRAROSA 6.5 mg Inst       ipratropium (ATROVENT) 42 mcg (0.06 %) nasal spray 2 SPRAYS BY NASAL ROUTE 3 (THREE) TIMES DAILY. 30 MINUTES BEFORE MEALS 45 mL 3    magnesium 250 mg Tab once daily.       melatonin 10 mg Cap every evening.       multivitamin capsule Take 1 capsule by mouth once daily.      nortriptyline (PAMELOR) 10 MG capsule TAKE 1 CAPSULE BY MOUTH EVERY EVENING. 90 capsule 1    RED YEAST RICE ORAL Take by mouth.      rizatriptan (MAXALT-MLT) 10 MG disintegrating tablet 1 melt at onset headache, second melt 2 hours later if needed, no more than 2 uses day/3 days use in week 10 tablet 6    semaglutide, weight loss, (WEGOVY) 0.25 mg/0.5 mL PnIj Inject 0.25 mg into the skin every 7 days. 4 each 6    tiZANidine (ZANAFLEX) 2 MG tablet TAKE ONE TABLET BY MOUTH 1 TO 2 HOURS BEFORE BED AS NEEDED FOR TMJ/HEADACHES 90 tablet 1    TRANEXAMIC ACID ORAL       valACYclovir (VALTREX) 1000 MG tablet Take 1,000 mg by mouth 2 (two) times daily as needed.       No current facility-administered medications for this visit.     Review of patient's allergies indicates:   Allergen Reactions    Bactrim [sulfamethoxazole-trimethoprim] Swelling       PMHx:  Past Medical History:   Diagnosis Date    Anemia     Headache      Past Surgical History:   Procedure Laterality Date    BREAST BIOPSY Right     age 23 benign     SECTION      x 3    GASTRIC BYPASS      Rodriguez n Y       Fhx:  Family History   Problem Relation Name Age of Onset    Lung cancer  Paternal Grandfather          lung    Colon cancer Paternal Grandmother          colon    Ovarian cancer Maternal Grandmother      Heart disease Maternal Grandfather      Hypertension Father      Heart disease Father      Thyroid nodules Mother      Hypothyroidism Mother      Macular degeneration Neg Hx      Glaucoma Neg Hx      Diabetes Neg Hx      Breast cancer Neg Hx         Shx:   Social History     Socioeconomic History    Marital status:    Occupational History    Occupation: nurse    Tobacco Use    Smoking status: Never    Smokeless tobacco: Never   Substance and Sexual Activity    Alcohol use: Yes     Comment: 2x year    Drug use: No    Sexual activity: Yes     Partners: Male     Birth control/protection: None, Partner-Vasectomy     Social Determinants of Health     Financial Resource Strain: Low Risk  (12/4/2023)    Overall Financial Resource Strain (CARDIA)     Difficulty of Paying Living Expenses: Not hard at all   Food Insecurity: No Food Insecurity (12/4/2023)    Hunger Vital Sign     Worried About Running Out of Food in the Last Year: Never true     Ran Out of Food in the Last Year: Never true   Transportation Needs: No Transportation Needs (12/4/2023)    PRAPARE - Transportation     Lack of Transportation (Medical): No     Lack of Transportation (Non-Medical): No   Physical Activity: Insufficiently Active (12/4/2023)    Exercise Vital Sign     Days of Exercise per Week: 3 days     Minutes of Exercise per Session: 30 min   Stress: No Stress Concern Present (12/4/2023)    [place] [employer] - Occupational Stress Questionnaire     Feeling of Stress : Not at all   Housing Stability: Low Risk  (12/4/2023)    Housing Stability Vital Sign     Unable to Pay for Housing in the Last Year: No     Number of Places Lived in the Last Year: 1     Unstable Housing in the Last Year: No           Labs:   Reviewed in chart     Imaging:   Reviewed in chart       Other testing:  Reviewed in  "chart     Note: I have independently reviewed any/all imaging/labs/tests and agree with the report (s) as documented.  Any discrepancies will be as noted/demarcated by free text.  STEVIE TAM 8/28/2024                     ROS:   Review Of Systems (questions asked, positive or additions in BOLD)  Gen: Weight change, fatigue/malaise, pyrexia   HEENT: Tinnitus, headache,  blurred vision, eye pain, diplopia, photophobia,  nose bleeds, congestion, sore throat, jaw pain, scalp pain, neck stiffness   Card: Palpitations, CP   Pulm: SOB, cough   Vas: Easy bruising, easy bleeding   GI: N/V/D/C, incontinence, hematemesis, hematochezia    : incontinence, hematuria   Endocrine: Temp intolerance, polyuria, polydipsia   M/S: Neck pain, myalgia, back pain, joint pain, falls    Neuro: PER HPI   PSY: Memory loss, confusion, depression, anxiety, trouble in sleep          EXAM:   /76 (BP Location: Right arm, Patient Position: Sitting, BP Method: Medium (Automatic))   Pulse 81   Temp 97 °F (36.1 °C) (Temporal)   Resp 17   Ht 5' 4" (1.626 m)   Wt 73.5 kg (161 lb 14.9 oz)   BMI 27.79 kg/m²     GEN:  NAD  HEENT: NC/AT, nasal piercing (LEFT nostril), no allodynia to touch in any region of facie, occiput/trapezius NTTP, no Keith sign on nose      EXTREM:   no edema present.    NEURO:  Mental Status:  Awake, alert and appropriately oriented to time, place, and person.  Normal attention and concentration.  Speech is fluent and appropriate language function (I.e., comprehension).      Cranial Nerves:     Extraocular movements are intact and without nystagmus.   Facial movement is symmetric.  Sensory intact to touch in all facie.  Hearing is normal. Uvula in midline. DROM of neck in all (6) directions, shoulder shrug symmetrical. Tongue in midline without fasiculation.                Motor: antigravity in all limbs  No drift  No resting tremor      DTR: 2+ bilat patellar     Gait and Stance: Normal manner of stance and gait " function testing.                 This document has been electronically signed by  Dwight HENRYLima Loyola MPA, PA-C on 8/28/2024, I have personally typed this message using the EMR.       Dr Lorenzo MD  was available during today's visit.   Personal Protective Equipment:    Personal Protective Equipment was used during this encounter including;  non latex gloves.       CC: Sonja Hewitt,                    CONSTITUTIONAL: non-toxic, well appearing  SKIN: no rash, no petechiae.  EYES: pink conjunctiva, anicteric  NECK: Supple; no meningismus, no JVD  CARD: RRR, no murmurs, equal radial pulses bilaterally 2+  RESP: CTAB, no respiratory distress  ABD: Soft, non-tender, non-distended, no peritoneal signs  EXT: Normal ROM x4. No edema.  NEURO: Alert, oriented. Neuro exam nonfocal  PSYCH: Cooperative, appropriate.

## 2024-11-04 ENCOUNTER — PATIENT MESSAGE (OUTPATIENT)
Dept: FAMILY MEDICINE | Facility: CLINIC | Age: 49
End: 2024-11-04
Payer: COMMERCIAL

## 2024-11-04 DIAGNOSIS — Z00.00 LABORATORY EXAMINATION ORDERED AS PART OF A COMPLETE PHYSICAL EXAMINATION: Primary | ICD-10-CM

## 2024-11-04 DIAGNOSIS — Z11.1 SCREENING FOR TUBERCULOSIS: ICD-10-CM

## 2024-11-06 ENCOUNTER — LAB VISIT (OUTPATIENT)
Dept: LAB | Facility: HOSPITAL | Age: 49
End: 2024-11-06
Attending: INTERNAL MEDICINE
Payer: COMMERCIAL

## 2024-11-06 DIAGNOSIS — Z00.00 LABORATORY EXAMINATION ORDERED AS PART OF A COMPLETE PHYSICAL EXAMINATION: ICD-10-CM

## 2024-11-06 DIAGNOSIS — Z11.1 SCREENING FOR TUBERCULOSIS: ICD-10-CM

## 2024-11-06 LAB
ALBUMIN SERPL BCP-MCNC: 4.3 G/DL (ref 3.5–5.2)
ALP SERPL-CCNC: 84 U/L (ref 40–150)
ALT SERPL W/O P-5'-P-CCNC: 28 U/L (ref 10–44)
ANION GAP SERPL CALC-SCNC: 10 MMOL/L (ref 8–16)
AST SERPL-CCNC: 25 U/L (ref 10–40)
BASOPHILS # BLD AUTO: 0.04 K/UL (ref 0–0.2)
BASOPHILS NFR BLD: 0.7 % (ref 0–1.9)
BILIRUB SERPL-MCNC: 0.3 MG/DL (ref 0.1–1)
BUN SERPL-MCNC: 15 MG/DL (ref 6–20)
CALCIUM SERPL-MCNC: 9.7 MG/DL (ref 8.7–10.5)
CHLORIDE SERPL-SCNC: 104 MMOL/L (ref 95–110)
CHOLEST SERPL-MCNC: 299 MG/DL (ref 120–199)
CHOLEST/HDLC SERPL: 2.8 {RATIO} (ref 2–5)
CO2 SERPL-SCNC: 27 MMOL/L (ref 23–29)
CREAT SERPL-MCNC: 1 MG/DL (ref 0.5–1.4)
DIFFERENTIAL METHOD BLD: ABNORMAL
EOSINOPHIL # BLD AUTO: 0.2 K/UL (ref 0–0.5)
EOSINOPHIL NFR BLD: 3 % (ref 0–8)
ERYTHROCYTE [DISTWIDTH] IN BLOOD BY AUTOMATED COUNT: 13.4 % (ref 11.5–14.5)
EST. GFR  (NO RACE VARIABLE): >60 ML/MIN/1.73 M^2
ESTIMATED AVG GLUCOSE: 100 MG/DL (ref 68–131)
GLUCOSE SERPL-MCNC: 92 MG/DL (ref 70–110)
HBA1C MFR BLD: 5.1 % (ref 4–5.6)
HCT VFR BLD AUTO: 42.4 % (ref 37–48.5)
HDLC SERPL-MCNC: 107 MG/DL (ref 40–75)
HDLC SERPL: 35.8 % (ref 20–50)
HGB BLD-MCNC: 13.5 G/DL (ref 12–16)
IMM GRANULOCYTES # BLD AUTO: 0.02 K/UL (ref 0–0.04)
IMM GRANULOCYTES NFR BLD AUTO: 0.4 % (ref 0–0.5)
LDLC SERPL CALC-MCNC: 177 MG/DL (ref 63–159)
LYMPHOCYTES # BLD AUTO: 2.1 K/UL (ref 1–4.8)
LYMPHOCYTES NFR BLD: 37.3 % (ref 18–48)
MCH RBC QN AUTO: 31.1 PG (ref 27–31)
MCHC RBC AUTO-ENTMCNC: 31.8 G/DL (ref 32–36)
MCV RBC AUTO: 98 FL (ref 82–98)
MONOCYTES # BLD AUTO: 0.4 K/UL (ref 0.3–1)
MONOCYTES NFR BLD: 7.2 % (ref 4–15)
NEUTROPHILS # BLD AUTO: 2.9 K/UL (ref 1.8–7.7)
NEUTROPHILS NFR BLD: 51.4 % (ref 38–73)
NONHDLC SERPL-MCNC: 192 MG/DL
NRBC BLD-RTO: 0 /100 WBC
PLATELET # BLD AUTO: 233 K/UL (ref 150–450)
PMV BLD AUTO: 10.7 FL (ref 9.2–12.9)
POTASSIUM SERPL-SCNC: 4.2 MMOL/L (ref 3.5–5.1)
PROT SERPL-MCNC: 7.9 G/DL (ref 6–8.4)
RBC # BLD AUTO: 4.34 M/UL (ref 4–5.4)
SODIUM SERPL-SCNC: 141 MMOL/L (ref 136–145)
TRIGL SERPL-MCNC: 75 MG/DL (ref 30–150)
TSH SERPL DL<=0.005 MIU/L-ACNC: 2.73 UIU/ML (ref 0.4–4)
WBC # BLD AUTO: 5.68 K/UL (ref 3.9–12.7)

## 2024-11-06 PROCEDURE — 86480 TB TEST CELL IMMUN MEASURE: CPT | Performed by: INTERNAL MEDICINE

## 2024-11-06 PROCEDURE — 83036 HEMOGLOBIN GLYCOSYLATED A1C: CPT | Performed by: INTERNAL MEDICINE

## 2024-11-06 PROCEDURE — 80053 COMPREHEN METABOLIC PANEL: CPT | Performed by: INTERNAL MEDICINE

## 2024-11-06 PROCEDURE — 85025 COMPLETE CBC W/AUTO DIFF WBC: CPT | Performed by: INTERNAL MEDICINE

## 2024-11-06 PROCEDURE — 36415 COLL VENOUS BLD VENIPUNCTURE: CPT | Mod: PN | Performed by: INTERNAL MEDICINE

## 2024-11-06 PROCEDURE — 80061 LIPID PANEL: CPT | Performed by: INTERNAL MEDICINE

## 2024-11-06 PROCEDURE — 84443 ASSAY THYROID STIM HORMONE: CPT | Performed by: INTERNAL MEDICINE

## 2024-11-07 ENCOUNTER — PATIENT MESSAGE (OUTPATIENT)
Dept: FAMILY MEDICINE | Facility: CLINIC | Age: 49
End: 2024-11-07
Payer: COMMERCIAL

## 2024-11-07 LAB
GAMMA INTERFERON BACKGROUND BLD IA-ACNC: 0.04 IU/ML
M TB IFN-G CD4+ BCKGRND COR BLD-ACNC: 0 IU/ML
M TB IFN-G CD4+ BCKGRND COR BLD-ACNC: 0.01 IU/ML
MITOGEN IGNF BCKGRD COR BLD-ACNC: 9.96 IU/ML
TB GOLD PLUS: NEGATIVE

## 2024-11-13 ENCOUNTER — OFFICE VISIT (OUTPATIENT)
Dept: FAMILY MEDICINE | Facility: CLINIC | Age: 49
End: 2024-11-13
Payer: COMMERCIAL

## 2024-11-13 DIAGNOSIS — F41.8 SITUATIONAL ANXIETY: Primary | ICD-10-CM

## 2024-11-13 PROBLEM — F41.9 ANXIETY: Status: ACTIVE | Noted: 2024-11-13

## 2024-11-13 PROCEDURE — 1159F MED LIST DOCD IN RCRD: CPT | Mod: CPTII,95,, | Performed by: NURSE PRACTITIONER

## 2024-11-13 PROCEDURE — 1160F RVW MEDS BY RX/DR IN RCRD: CPT | Mod: CPTII,95,, | Performed by: NURSE PRACTITIONER

## 2024-11-13 PROCEDURE — 3044F HG A1C LEVEL LT 7.0%: CPT | Mod: CPTII,95,, | Performed by: NURSE PRACTITIONER

## 2024-11-13 PROCEDURE — 99213 OFFICE O/P EST LOW 20 MIN: CPT | Mod: 95,,, | Performed by: NURSE PRACTITIONER

## 2024-11-13 NOTE — PROGRESS NOTES
Answers submitted by the patient for this visit:  Review of Systems Questionnaire (Submitted on 11/12/2024)  activity change: No  unexpected weight change: No  neck pain: No  hearing loss: No  rhinorrhea: No  trouble swallowing: No  eye discharge: No  visual disturbance: No  chest tightness: No  wheezing: No  chest pain: No  palpitations: No  blood in stool: No  constipation: No  vomiting: No  diarrhea: No  polydipsia: No  polyuria: No  difficulty urinating: No  hematuria: No  menstrual problem: No  dysuria: No  joint swelling: No  arthralgias: No  headaches: No  weakness: No  confusion: No  dysphoric mood: No    The patient location is: [place]  The chief complaint leading to consultation is: Anxiety/school    Visit type: audiovisual    Face to Face time with patient: 16  22 minutes of total time spent on the encounter, which includes face to face time and non-face to face time preparing to see the patient (eg, review of tests), Obtaining and/or reviewing separately obtained history, Documenting clinical information in the electronic or other health record, Independently interpreting results (not separately reported) and communicating results to the patient/family/caregiver, or Care coordination (not separately reported).     Each patient to whom he or she provides medical services by telemedicine is:  (1) informed of the relationship between the physician and patient and the respective role of any other health care provider with respect to management of the patient; and (2) notified that he or she may decline to receive medical services by telemedicine and may withdraw from such care at any time.    Subjective:       Patient ID: Rita Grant is a 49 y.o. female.    Chief Complaint: Anxiety/school    HPI Rita is a new patient to me, her PCP is Dr. Hewitt.  Rita is attending [employer]. Due to some underlying issues with anxiety she is having issues with taking timed tests. She is struggling  to complete her tests in a timely manner due to distractions and anxiety that she is not going to complete her work on time. She finds this issue is not following her outside of school to the point that she is anxious and worried about taking a test long before test time. She is having trouble concentrating on the task at hand due to fear of not completing her work. She discussed this with her school and was advised to get a letter of recommendation for extended time and a non-distracting environment during testing. She does not feel that medication for anxiety is needed at this time. She will follow up if that changes.     I discussed with her PCP and she agrees this would be beneficial for Rita. Letter written.     The following portion of the patients history was reviewed and updated as appropriate: allergies, current medications, past medical and surgical history. Past social history and problem list reviewed. Family PMH and Past social history reviewed. Tobacco, Illicit drug use reviewed.      Review of patient's allergies indicates:   Allergen Reactions    Bactrim [sulfamethoxazole-trimethoprim] Swelling         Current Outpatient Medications:     baclofen (LIORESAL) 10 MG tablet, 1 TID PRN facial pain, Disp: 30 tablet, Rfl: 0    biotin 10 mg Tab, once daily. , Disp: , Rfl:     ergocalciferol, vitamin D2, (VITAMIN D ORAL), once daily. , Disp: , Rfl:     HYDROcodone-acetaminophen (NORCO)  mg per tablet, Take 1 tablet by mouth. (Patient not taking: Reported on 4/15/2024), Disp: , Rfl:     hydrOXYzine pamoate (VISTARIL) 50 MG Cap, TAKE 1 CAPSULE BY MOUTH 3 TIMES A DAY X 2 DAYS ANY LEFT OVER EVERY 8 HOURS AS NEEDED FOR BAD HEADACHE NO DRIVING CAUSES SEDATION, Disp: 15 capsule, Rfl: 0    INTRAROSA 6.5 mg Inst, , Disp: , Rfl:     ipratropium (ATROVENT) 42 mcg (0.06 %) nasal spray, 2 SPRAYS BY NASAL ROUTE 3 (THREE) TIMES DAILY. 30 MINUTES BEFORE MEALS, Disp: 45 mL, Rfl: 3    magnesium 250 mg Tab, once daily.  , Disp: , Rfl:     melatonin 10 mg Cap, every evening. , Disp: , Rfl:     multivitamin capsule, Take 1 capsule by mouth once daily., Disp: , Rfl:     nortriptyline (PAMELOR) 10 MG capsule, Take 1 capsule (10 mg total) by mouth every evening., Disp: 90 capsule, Rfl: 1    RED YEAST RICE ORAL, Take by mouth., Disp: , Rfl:     rizatriptan (MAXALT-MLT) 10 MG disintegrating tablet, 1 melt at onset headache, second melt 2 hours later if needed, no more than 2 uses day/3 days use in week, Disp: 10 tablet, Rfl: 6    semaglutide, weight loss, (WEGOVY) 0.25 mg/0.5 mL PnIj, Inject 0.25 mg into the skin every 7 days., Disp: 4 each, Rfl: 6    tiZANidine (ZANAFLEX) 2 MG tablet, TAKE ONE TABLET BY MOUTH 1 TO 2 HOURS BEFORE BED AS NEEDED FOR TMJ/HEADACHES, Disp: 90 tablet, Rfl: 1    TRANEXAMIC ACID ORAL, , Disp: , Rfl:     valACYclovir (VALTREX) 1000 MG tablet, Take 1,000 mg by mouth 2 (two) times daily as needed., Disp: , Rfl:     Past Medical History:   Diagnosis Date    Anemia     Headache        Past Surgical History:   Procedure Laterality Date    BREAST BIOPSY Right     age 23 benign     SECTION      x 3    GASTRIC BYPASS  2001    Rodriguez n Y       Social History     Socioeconomic History    Marital status:    Occupational History    Occupation: nurse    Tobacco Use    Smoking status: Never    Smokeless tobacco: Never   Substance and Sexual Activity    Alcohol use: Yes     Comment: 2x year    Drug use: No    Sexual activity: Yes     Partners: Male     Birth control/protection: None, Partner-Vasectomy     Social Drivers of Health     Financial Resource Strain: Low Risk  (2023)    Overall Financial Resource Strain (CARDIA)     Difficulty of Paying Living Expenses: Not hard at all   Food Insecurity: No Food Insecurity (2023)    Hunger Vital Sign     Worried About Running Out of Food in the Last Year: Never true     Ran Out of Food in the Last Year: Never true   Transportation Needs: No Transportation Needs  (12/4/2023)    PRAPARE - Transportation     Lack of Transportation (Medical): No     Lack of Transportation (Non-Medical): No   Physical Activity: Insufficiently Active (12/4/2023)    Exercise Vital Sign     Days of Exercise per Week: 3 days     Minutes of Exercise per Session: 30 min   Stress: No Stress Concern Present (12/4/2023)    [employer] [place] [employer] Occupational [employer] - Occupational Stress Questionnaire     Feeling of Stress : Not at all   Housing Stability: Low Risk  (12/4/2023)    Housing Stability Vital Sign     Unable to Pay for Housing in the Last Year: No     Number of Places Lived in the Last Year: 1     Unstable Housing in the Last Year: No     Review of Systems   Constitutional:  Negative for activity change and unexpected weight change.   HENT:  Negative for hearing loss, rhinorrhea and trouble swallowing.    Eyes:  Negative for discharge and visual disturbance.   Respiratory:  Negative for cough, chest tightness, shortness of breath and wheezing.    Cardiovascular:  Negative for chest pain and palpitations.   Gastrointestinal:  Negative for blood in stool, constipation, diarrhea and vomiting.   Endocrine: Negative for polydipsia and polyuria.   Genitourinary:  Negative for difficulty urinating and dysuria.   Musculoskeletal:  Negative for arthralgias, joint swelling and neck pain.   Neurological:  Negative for weakness and headaches.   Psychiatric/Behavioral:  Negative for confusion and dysphoric mood. The patient is nervous/anxious (with testing at school).        Objective:      Physical Exam  Constitutional:       General: She is not in acute distress.     Appearance: She is well-developed.   Pulmonary:      Effort: No accessory muscle usage or respiratory distress.   Musculoskeletal:      Cervical back: Normal range of motion.      Comments: Coordination appears normal per video   Neurological:      Mental Status: She is alert and oriented to person, place, and time.   Psychiatric:          Speech: Speech normal.         Behavior: Behavior normal.         Thought Content: Thought content normal.         Assessment:       1. Situational anxiety        Plan:       Situational anxiety: letter requesting accommodation provided.       Continue current medication  Take medications only as prescribed  Healthy diet, exercise  Adequate rest  Adequate hydration  Avoid allergens  Avoid excessive caffeine     I spent a total of 22 minutes on the day of the visit.     This includes face to face time with the patient, as well as non-face to face time preparing for and completing the visit (review of prior diagnostic testing and clinical notes, obtaining or reviewing history, documenting clinical information in the EMR, independently interpreting and communicating results to the patient/family and coordinating ongoing care).         Follow up with PCP as scheduled.

## 2024-12-05 ENCOUNTER — OFFICE VISIT (OUTPATIENT)
Dept: FAMILY MEDICINE | Facility: CLINIC | Age: 49
End: 2024-12-05
Payer: COMMERCIAL

## 2024-12-05 VITALS
HEIGHT: 64 IN | SYSTOLIC BLOOD PRESSURE: 114 MMHG | DIASTOLIC BLOOD PRESSURE: 76 MMHG | RESPIRATION RATE: 16 BRPM | HEART RATE: 78 BPM | BODY MASS INDEX: 29.08 KG/M2 | WEIGHT: 170.31 LBS | TEMPERATURE: 98 F | OXYGEN SATURATION: 97 %

## 2024-12-05 DIAGNOSIS — F51.01 PRIMARY INSOMNIA: ICD-10-CM

## 2024-12-05 DIAGNOSIS — Z98.84 HISTORY OF ROUX-EN-Y GASTRIC BYPASS: ICD-10-CM

## 2024-12-05 DIAGNOSIS — E53.8 VITAMIN B 12 DEFICIENCY: ICD-10-CM

## 2024-12-05 DIAGNOSIS — Z00.00 WELL ADULT EXAM: Primary | ICD-10-CM

## 2024-12-05 DIAGNOSIS — E78.5 HYPERLIPIDEMIA, UNSPECIFIED HYPERLIPIDEMIA TYPE: ICD-10-CM

## 2024-12-05 DIAGNOSIS — R51.9 MIXED HEADACHE: ICD-10-CM

## 2024-12-05 DIAGNOSIS — E55.9 VITAMIN D DEFICIENCY: ICD-10-CM

## 2024-12-05 PROCEDURE — 1160F RVW MEDS BY RX/DR IN RCRD: CPT | Mod: CPTII,S$GLB,, | Performed by: INTERNAL MEDICINE

## 2024-12-05 PROCEDURE — 3008F BODY MASS INDEX DOCD: CPT | Mod: CPTII,S$GLB,, | Performed by: INTERNAL MEDICINE

## 2024-12-05 PROCEDURE — 1159F MED LIST DOCD IN RCRD: CPT | Mod: CPTII,S$GLB,, | Performed by: INTERNAL MEDICINE

## 2024-12-05 PROCEDURE — 3074F SYST BP LT 130 MM HG: CPT | Mod: CPTII,S$GLB,, | Performed by: INTERNAL MEDICINE

## 2024-12-05 PROCEDURE — 99396 PREV VISIT EST AGE 40-64: CPT | Mod: S$GLB,,, | Performed by: INTERNAL MEDICINE

## 2024-12-05 PROCEDURE — 3044F HG A1C LEVEL LT 7.0%: CPT | Mod: CPTII,S$GLB,, | Performed by: INTERNAL MEDICINE

## 2024-12-05 PROCEDURE — 3078F DIAST BP <80 MM HG: CPT | Mod: CPTII,S$GLB,, | Performed by: INTERNAL MEDICINE

## 2024-12-05 RX ORDER — ATORVASTATIN CALCIUM 10 MG/1
10 TABLET, FILM COATED ORAL DAILY
Qty: 90 TABLET | Refills: 3 | Status: SHIPPED | OUTPATIENT
Start: 2024-12-05 | End: 2025-12-05

## 2024-12-05 NOTE — PROGRESS NOTES
Subjective:       Patient ID: Rita Grant is a 49 y.o. female.    Medication List with Changes/Refills   New Medications    ATORVASTATIN (LIPITOR) 10 MG TABLET    Take 1 tablet (10 mg total) by mouth once daily.   Current Medications    BACLOFEN (LIORESAL) 10 MG TABLET    1 TID PRN facial pain    BIOTIN 10 MG TAB    once daily.     ERGOCALCIFEROL, VITAMIN D2, (VITAMIN D ORAL)    once daily.     HYDROXYZINE PAMOATE (VISTARIL) 50 MG CAP    TAKE 1 CAPSULE BY MOUTH 3 TIMES A DAY X 2 DAYS ANY LEFT OVER EVERY 8 HOURS AS NEEDED FOR BAD HEADACHE NO DRIVING CAUSES SEDATION    MAGNESIUM 250 MG TAB    once daily.     MELATONIN 10 MG CAP    every evening.     MULTIVITAMIN CAPSULE    Take 1 capsule by mouth once daily.    NORTRIPTYLINE (PAMELOR) 10 MG CAPSULE    Take 1 capsule (10 mg total) by mouth every evening.    RIZATRIPTAN (MAXALT-MLT) 10 MG DISINTEGRATING TABLET    1 melt at onset headache, second melt 2 hours later if needed, no more than 2 uses day/3 days use in week    TIZANIDINE (ZANAFLEX) 2 MG TABLET    TAKE ONE TABLET BY MOUTH 1 TO 2 HOURS BEFORE BED AS NEEDED FOR TMJ/HEADACHES    VALACYCLOVIR (VALTREX) 1000 MG TABLET    Take 1,000 mg by mouth 2 (two) times daily as needed.   Discontinued Medications    HYDROCODONE-ACETAMINOPHEN (NORCO)  MG PER TABLET    Take 1 tablet by mouth.    INTRAROSA 6.5 MG INST        IPRATROPIUM (ATROVENT) 42 MCG (0.06 %) NASAL SPRAY    2 SPRAYS BY NASAL ROUTE 3 (THREE) TIMES DAILY. 30 MINUTES BEFORE MEALS    RED YEAST RICE ORAL    Take by mouth.    SEMAGLUTIDE, WEIGHT LOSS, (WEGOVY) 0.25 MG/0.5 ML PNIJ    Inject 0.25 mg into the skin every 7 days.    TRANEXAMIC ACID ORAL           Chief Complaint: Annual Exam  She is here today for her annual exam.      She has hyperlipidemia with lilpids on 11/2024 were 299/75/107/177.  She is taking red rice yeast regularly.      She has history of bypass surgery (Rodriguez--Y) in 2001. She denies any complications since the bypass surgery. Labs on  4/2023 show vitamin B12 760. Vitamin D of 32 on 11/2022. She is taking OTC vitamin D supplementation.      She was noted to have elevated LFTs on 4/2023 with AST 38 and ALT 38. Repeat LFTs on 11/2024 were normal.      She has mixed headaches with tension and migraines. She describes headaches as frontal headaches bilateral that throb. Worse with lights and working on computer. Better with tylenol. They are occasionally associated with nausea but no vomiting. She was seen in headaches clinic and continues on nortriptyline 10 mg daily, continued on magnesium daily and given maxalt to use as needed. She has noticed increased headaches due to stress with working and going to school.  She does not want to adjust her nortriptyline because higher doses make her fingers tingle.  She will occasionally use baclofen if she has burning and tingling in her face (neurology felt she may have some trigeminal neuralgia symptoms). She does not use this regularly.     She has occasional insomnia that responds to hydroxyzine 50 mg.  She is taking valerian root nightly which has helped her sleep.     She will occasionally have tmj symptoms and will use tizanidine as needed.       She lives with her  and 4 children. She feels safe at home. She has not been exercising or eating healthy. 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       Fecal kit----3/2024 neg   Mammogram----12/2024 pending results   Pap-----1/2020 negative HPV neg  Tdap---10/2017  Influenza vaccine---9/2024  Covid vaccine---1 dose    Review of Systems   Constitutional:  Negative for activity change, appetite change, fatigue, fever and unexpected weight change.   HENT:  Negative for congestion, ear pain, hearing loss, rhinorrhea, sore throat and trouble swallowing.    Eyes:  Negative for pain, discharge and visual disturbance.   Respiratory:  Negative for cough, chest tightness, shortness of breath and wheezing.   "  Cardiovascular:  Negative for chest pain, palpitations and leg swelling.   Gastrointestinal:  Positive for constipation and diarrhea. Negative for abdominal pain, blood in stool, nausea and vomiting.   Endocrine: Negative for polydipsia and polyuria.   Genitourinary:  Positive for urgency. Negative for difficulty urinating, dysuria, hematuria and menstrual problem.   Musculoskeletal:  Positive for arthralgias. Negative for back pain, joint swelling, myalgias and neck pain.   Skin:  Negative for rash.   Neurological:  Positive for headaches. Negative for dizziness, weakness and numbness.   Hematological:  Does not bruise/bleed easily.   Psychiatric/Behavioral:  Negative for confusion, dysphoric mood, sleep disturbance and suicidal ideas. The patient is nervous/anxious.        Objective:      Vitals:    12/05/24 0655   BP: 114/76   BP Location: Right arm   Patient Position: Sitting   Pulse: 78   Resp: 16   Temp: 98.2 °F (36.8 °C)   TempSrc: Temporal   SpO2: 97%   Weight: 77.3 kg (170 lb 4.9 oz)   Height: 5' 4" (1.626 m)     Body mass index is 29.23 kg/m².  Physical Exam    General appearance: No acute distress, cooperative  Eyes: PERRL, EOMI, conjunctiva clear  Ears: normal external ear and pinna, tm clear without drainage, canals clear  Nose: Normal mucosa without drainage  Throat: no exudates or erythema, tonsils not enlarged  Mouth: no sores or lesions, moist mucous membranes  Neck: FROM, soft, supple, no thyromegaly, no bruits  Lymph: no anterior or posterior cervical adenopathy  Heart::  Regular rate and rhythm, no murmur  Lung: Clear to ascultation bilaterally, no wheezing, no rales, no rhonchi, no distress  Abdomen: Soft, nontender, no distention, no hepatosplenomegaly, bowel sounds normal, no guarding, no rebound, no peritoneal signs  Skin: no rashes, no lesions  Extremities: no edema, no cyanosis  Neuro: CN 2-12 intact, 5/5 muscle strength upper and lower extremity bilaterally, 2+ DTRs UE and LE " bilaterally, normal gait  Peripheral pulses: 2+ pedal pulses bilaterally, good perfusion and color  Musculoskeletal: FROM, good strenth, no tenderness  Joint: normal appearance, no swelling, no warmth, no deformity in all joints    Assessment:       1. Well adult exam    2. Hyperlipidemia, unspecified hyperlipidemia type    3. History of Rodriguez-en-Y gastric bypass    4. Vitamin B 12 deficiency    5. Vitamin D deficiency    6. Mixed headache    7. Primary insomnia        Plan:       Well adult exam  She is UTD on labs, mammogram and fecal kit. She is due for a pap in 2025.  She is UTD with flu vaccine    Hyperlipidemia, unspecified hyperlipidemia type  Uncontrolled and she is willing to start atorvastatin 10 mg daily. Recheck lipids in 3 months.   -     atorvastatin (LIPITOR) 10 MG tablet; Take 1 tablet (10 mg total) by mouth once daily.  Dispense: 90 tablet; Refill: 3  -     Lipid Panel; Future; Expected date: 12/05/2024    History of Rodriguez-en-Y gastric bypass with Vitamin B 12 deficiency/Vitamin D deficiency  Continue OTC supplementation    Mixed headache  Mild increase in symptoms triggered by stress (working and going to school).  She does not want to adjust her nortriptyline so continue at 10 mg daily. Continue magnesium daily and maxalt as needed. Okay to use baclofen PRN facial symptoms and tizanidine PRN tmj symptoms.     Primary insomnia  Improved on valerian root nightly. Okay to use hydroxyzine as needed.     Follow up in about 1 year (around 12/5/2025) for annual exam.

## 2024-12-15 ENCOUNTER — PATIENT MESSAGE (OUTPATIENT)
Dept: FAMILY MEDICINE | Facility: CLINIC | Age: 49
End: 2024-12-15
Payer: COMMERCIAL

## 2024-12-30 ENCOUNTER — OFFICE VISIT (OUTPATIENT)
Dept: NEUROLOGY | Facility: CLINIC | Age: 49
End: 2024-12-30
Payer: COMMERCIAL

## 2024-12-30 VITALS
SYSTOLIC BLOOD PRESSURE: 115 MMHG | WEIGHT: 171.44 LBS | HEART RATE: 81 BPM | HEIGHT: 64 IN | TEMPERATURE: 98 F | RESPIRATION RATE: 17 BRPM | DIASTOLIC BLOOD PRESSURE: 72 MMHG | BODY MASS INDEX: 29.27 KG/M2

## 2024-12-30 DIAGNOSIS — G43.009 MIGRAINE WITHOUT AURA AND WITHOUT STATUS MIGRAINOSUS, NOT INTRACTABLE: Primary | ICD-10-CM

## 2024-12-30 PROCEDURE — 3078F DIAST BP <80 MM HG: CPT | Mod: CPTII,S$GLB,, | Performed by: PHYSICIAN ASSISTANT

## 2024-12-30 PROCEDURE — 1160F RVW MEDS BY RX/DR IN RCRD: CPT | Mod: CPTII,S$GLB,, | Performed by: PHYSICIAN ASSISTANT

## 2024-12-30 PROCEDURE — 3008F BODY MASS INDEX DOCD: CPT | Mod: CPTII,S$GLB,, | Performed by: PHYSICIAN ASSISTANT

## 2024-12-30 PROCEDURE — 1159F MED LIST DOCD IN RCRD: CPT | Mod: CPTII,S$GLB,, | Performed by: PHYSICIAN ASSISTANT

## 2024-12-30 PROCEDURE — 3044F HG A1C LEVEL LT 7.0%: CPT | Mod: CPTII,S$GLB,, | Performed by: PHYSICIAN ASSISTANT

## 2024-12-30 PROCEDURE — 99999 PR PBB SHADOW E&M-EST. PATIENT-LVL IV: CPT | Mod: PBBFAC,,, | Performed by: PHYSICIAN ASSISTANT

## 2024-12-30 PROCEDURE — 99214 OFFICE O/P EST MOD 30 MIN: CPT | Mod: S$GLB,,, | Performed by: PHYSICIAN ASSISTANT

## 2024-12-30 PROCEDURE — 3074F SYST BP LT 130 MM HG: CPT | Mod: CPTII,S$GLB,, | Performed by: PHYSICIAN ASSISTANT

## 2024-12-30 RX ORDER — HYDROXYZINE PAMOATE 50 MG/1
CAPSULE ORAL
Qty: 15 CAPSULE | Refills: 0 | Status: SHIPPED | OUTPATIENT
Start: 2024-12-30

## 2024-12-30 RX ORDER — RIZATRIPTAN BENZOATE 5 MG/1
TABLET, ORALLY DISINTEGRATING ORAL
Qty: 10 TABLET | Refills: 6 | Status: SHIPPED | OUTPATIENT
Start: 2024-12-30

## 2024-12-30 NOTE — PATIENT INSTRUCTIONS
How to Inject Emgality® for Migraine  Emgality® (galcanezumab-[facility])    Patient is requesting all refills for this medication be transferred to [facility]

## 2024-12-30 NOTE — PROGRESS NOTES
[facility]  [address] [facility] [address]  [place] [address]  Phone:[phone]  Fax: [phone]   Follow up visit       Patient Name: Rita Grant  : 1975  MRN:  [id number]  Today: 2024   LOV:2024  chief complaint: Headache      PCP: Sonja Hewitt DO.       Assessment:   Rita Grant is a 49 y.o. right handed female with a PMHx of: gastric bypass, raynauds, TMJ, HA, and HLD   whom presents with her daughter in follow up for HA. Appears now to have more chronic HA/migraines.            Review:    ICD-10-CM ICD-9-CM   1. Migraine without aura and without status migrainosus, not intractable  G43.009 346.10                     Plan:               -if HA change in quality/nature, will get updated imaging study          For HA Prevention:  Stop pamelor  Has zanaflex (PRN Neck pain/facial pain)  Will try emgality (declined qulipta), discussed adv effects/dosing, gave web address for  instructional video hyperlink in AVS   she would like to move forward with this medicine          For HA :  Decreased maxalt from 10 mg to 5 mg to see if this will benefit/not cause sedation      -if not working, let me know via portal, will consider an imitrex trial (she agreed)     To break up Headaches:  Vistaril refilled              All test results as well as any necessary instructions were reviewed and discussed with patient.    Review:  Orders Placed This Encounter    galcanezumab-gnlm 120 mg/mL PnIj    galcanezumab-gnlm 120 mg/mL PnIj    rizatriptan (MAXALT-MLT) 5 MG disintegrating tablet    hydrOXYzine pamoate (VISTARIL) 50 MG Cap                       Patient to return to PCP/other specialists for all other problems  Patient to continue on all medications as Rx'd   Full office note available for her reference online in secured patient portal   RTO 4months she will self schedule   The patient indicates understanding of these issues and agrees to  "the plan.    HPI:   Rita Grant is a 49 y.o.right handed, female with a PMHx of: gastric bypass, raynauds, TMJ, anxiety, HA, and HLD   whom presents with her daughter in follow up for HA.     At last visit (8/28/2024): pamelor 10 mg, PRN Zanaflex, maxalt and vistaril.   Stress and fluroescent lights can trigger GIL   Wearing contracts with bad lights can trigger HA   Past 2 months having more HA   12 + HD a month   Using OTC more as maxalt makes her tired, can't take until much later  Frontalis is location of pain   Last HA: Friday of last week   Sleeping better with pamelor, however, taking higher dose (20 mg) also caused problems in fingers (same as elavil)      -she would like to try something new, like the cGRP class      -when asked does have some periodic constipation   Vistaril break ups BAD HA, no side effects, does make her tired       From previous visit: pamelor, maxalt, vistaril and zanaflex   More HA since going back to work in June 2024, July was "a  bad month" but this month has been doing well   Having to take maxalt more often past few months   Tried going up on pamelor at 20 mg and felt it caused her hands to hurt   3-4 HD in past month   Hasn't take to take her abortive in past month   Hasn't had to use zanaflex or vistaril  Been under more stress  States she had shingles in past (high left forehead, didn't involve eye)  Has noticed in past few years, some pain starting in her left tip of nose/nostril and can run down into her upper teeth  Has seen dentist  No rash that she has observed  Rarely using zanaflex   Touch makes it worse  Like an "electrical shock" when it occurs, lasting a few seconds, multiple times a day, which can go on for a week or two then stop spontaneously   Currently experiencing in office  States her sleep and stress has been elevated lately, no pyrexia     From previous visit: pamelor, maxalt, vistaril and zanaflex      Had to use maxalt a few times since last " "visit (need for dental implant in left jaw coming up, potential trigger for HA)  5 HD a month -hormonal related/stress  No side effects from pamelor/maxalt/vistaril/zanaflex and feels this current regimen is working  Last HA was a week ago   +photophobia most days  Historically pain along frontalis   No HA at present    At last visit: has pamelor, maxalt and vistaril.   Menstural cycle and recent dental surgery ("Broke jaw/tooth from grinding, had it fixed a week ago, but then developed dry socket. Was given tramadol now I am on norco and amoxicillin. Been off the pamelor for past few days").   Mouth pain triggering some HA (also d/t lack of sleep)  No side effects from pamelor historically   Maxalt generally helps, no side effects  Vistatril does help with bad HA   Pain along frontalis when she has a HA   Last HA was yesterday        From previous visit: stopped elavil and started pamelor. Maxalt and vistaril available.   Liking pamelor, no side effects like the elavil   1st bad HA yesterday, possibly d/t sun exposure, maxalt was abortive  Pain along frontalis   Did use vistaril last night, because had some trouble sleeping, no side effects (except for some drowsiness this morning)  Doesn't want to make any medication changes         At last visit: continued elavil, had maxalt and vistaril. Sent message in May 2023 that PCP stopped her elavil d/t hand pain and hair loss (?).   Frontalis (dull)  Daily HA (30 HD/30)  Last >4, up to full day   Triggers bright light/sung light   Felt she was having worsening pain from her raynauds and hair loss (?) from 10 mg of elavil (?)        -feels both have improved, but also notices it is warmer now regarding raynauds  Was on elavil at 10 mg for a few months before noticing the above issues  Felt when was on elavil her HA were absent  Mild HA no, no other concerns   Hasn't had to use maxalt or vistaril       At last visit: started elavil, maxalt written along with a course of " "vistaril to break up her HA. PT was also ordered.   HA triggered by stress and certain lighting  Elavil helping without side effects  At most 3-4 HD a month, and only 1 migraine since last being seen   Has taken a total of 3 maxalts since it was Rx'd and all worked well without side effects  PT helping   Never used the vistaril  Noticed a slight tremor yesterday in RUE ("I was doing some heavy duty cleaning")  Felt it got better  No other new concerns     HA Historically:   HA HPI:  Start:HA since teenager, "I would like to go over options to treat this HA"  History of trauma (no), History of CNS infection (no), History of Stroke (no)  Location:frontalis and behind the eyes, sometimes stabbing pain in the temples (for years when asked), sometimes will be in occiput  Severity: range: 4-8/10  Duration:4+ hours, longer if she didn't take medicine for bad ones   Frequency:12-15 HD/30 (2 of which migrainous)   How many HA types do you have (?):2 (migraines add nausea and photophobia/phonophobia)   Associated factors (bolded positive) WITH HA (or migraine): Nausea, vomiting, photophobia, phonophobia, tinnitus, scalp pain, vision loss, diplopia, scintillations, eye pain, jaw pain, weakness?    Tried:tylenol   Triggers (in bold): stress, lack of sleep (has been using benadryl for years to sleep. "I also have sinus issues, so it helps that too), too much caffeine, too little caffeine, weather change, seasonal change, strong odours, bright lights, sunlight, food, hormonal changes, TMJ   Last HA: yesterday   Positives in bold: Hx of Kidney Stones, asthma, GI bleed, osteoporosis, CAD/MI, CVA/TIA, DM    Imaging on file: no imaging on head   Therapies tried in past: (failures to be marked, if known---why did it fail?)  MgOx  Zofran  Percocet (in her 20s)   Elavil  Maxalt  Vistaril   PT  Pamelor  Tramadol  baclofen      As aside a few weeks ago was studying and awoke on the table, unclear if she   "Passed out" or fell asleep. " "States at times some trouble in sleep. "I was getting a HA and when I woke up I was worried I had a stroke because I was confused and had a bad HA. I was also full of drool." Denies Hx of seizure, unclear if she was dehydrated/missing a meal/ over tired, denies incontinence/tongue biting, states unwitnessed, and no stroke symptoms either (e.g., facial droop, slurred speech, numbness/weakness on one side, etc).     Medication Reconciliation:   Current Outpatient Medications   Medication Sig Dispense Refill    atorvastatin (LIPITOR) 10 MG tablet Take 1 tablet (10 mg total) by mouth once daily. 90 tablet 3    baclofen (LIORESAL) 10 MG tablet 1 TID PRN facial pain 30 tablet 0    biotin 10 mg Tab once daily.       ergocalciferol, vitamin D2, (VITAMIN D ORAL) once daily.       hydrOXYzine pamoate (VISTARIL) 50 MG Cap TAKE 1 CAPSULE BY MOUTH 3 TIMES A DAY X 2 DAYS ANY LEFT OVER EVERY 8 HOURS AS NEEDED FOR BAD HEADACHE NO DRIVING CAUSES SEDATION 15 capsule 0    magnesium 250 mg Tab once daily.       melatonin 10 mg Cap every evening.       multivitamin capsule Take 1 capsule by mouth once daily.      nortriptyline (PAMELOR) 10 MG capsule Take 1 capsule (10 mg total) by mouth every evening. 90 capsule 1    rizatriptan (MAXALT-MLT) 10 MG disintegrating tablet 1 melt at onset headache, second melt 2 hours later if needed, no more than 2 uses day/3 days use in week 10 tablet 6    tiZANidine (ZANAFLEX) 2 MG tablet TAKE ONE TABLET BY MOUTH 1 TO 2 HOURS BEFORE BED AS NEEDED FOR TMJ/HEADACHES 90 tablet 1    valACYclovir (VALTREX) 1000 MG tablet Take 1,000 mg by mouth 2 (two) times daily as needed.       No current facility-administered medications for this visit.     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     Headache      Past Surgical History:   Procedure " Laterality Date    BREAST BIOPSY Right     age 23 benign     SECTION      x 3    GASTRIC BYPASS      Rodriguez n Y       Fhx:  Family History   Problem Relation Name Age of Onset    Thyroid nodules Mother      Hypothyroidism Mother      Hypertension Father      Heart disease Father      Ovarian cancer Maternal Grandmother  70 - 79    Heart disease Maternal Grandfather      Colon cancer Paternal Grandmother          colon    Lung cancer Paternal Grandfather          lung    Macular degeneration Neg Hx      Glaucoma Neg Hx      Diabetes Neg Hx      Breast cancer Neg Hx         Shx:   Social History     Socioeconomic History    Marital status:    Occupational History    Occupation: nurse    Tobacco Use    Smoking status: Never    Smokeless tobacco: Never   Substance and Sexual Activity    Alcohol use: Yes     Comment: 2x year    Drug use: No    Sexual activity: Yes     Partners: Male     Birth control/protection: None, Partner-Vasectomy     Social Drivers of Health     Financial Resource Strain: Low Risk  (2023)    Overall Financial Resource Strain (CARDIA)     Difficulty of Paying Living Expenses: Not hard at all   Food Insecurity: No Food Insecurity (2023)    Hunger Vital Sign     Worried About Running Out of Food in the Last Year: Never true     Ran Out of Food in the Last Year: Never true   Transportation Needs: No Transportation Needs (2023)    PRAPARE - Transportation     Lack of Transportation (Medical): No     Lack of Transportation (Non-Medical): No   Physical Activity: Unknown (2023)    Exercise Vital Sign     Days of Exercise per Week: 3 days   Recent Concern: Physical Activity - Insufficiently Active (2023)    Exercise Vital Sign     Days of Exercise per Week: 3 days     Minutes of Exercise per Session: 30 min   Stress: No Stress Concern Present (2023)    [employer] [place] [employer] - Occupational Stress Questionnaire     Feeling of Stress : Not at  "all   Housing Stability: Low Risk  (12/4/2023)    Housing Stability Vital Sign     Unable to Pay for Housing in the Last Year: No     Number of Places Lived in the Last Year: 1     Unstable Housing in the Last Year: No           Labs:   Reviewed in chart     Imaging:   Reviewed in chart       Other testing:  Reviewed in chart     Note: I have independently reviewed any/all imaging/labs/tests and agree with the report (s) as documented.  Any discrepancies will be as noted/demarcated by free text.  STEVIE TAM 12/30/2024                     ROS:   Review Of Systems (questions asked, positive or additions in BOLD)  Gen: Weight change, fatigue/malaise, pyrexia   HEENT: Tinnitus, headache,  blurred vision, eye pain, diplopia, photophobia,  nose bleeds, congestion, sore throat, jaw pain, scalp pain, neck stiffness   Card: Palpitations, CP   Pulm: SOB, cough   Vas: Easy bruising, easy bleeding   GI: N/V/D/C, incontinence, hematemesis, hematochezia    : incontinence, hematuria   Endocrine: Temp intolerance, polyuria, polydipsia   M/S: Neck pain, myalgia, back pain, joint pain, falls    Neuro: PER HPI   PSY: Memory loss, confusion, depression, anxiety, trouble in sleep          EXAM:   /72 (BP Location: Right arm, Patient Position: Sitting)   Pulse 81   Temp 98.1 °F (36.7 °C) (Temporal)   Resp 17   Ht 5' 4" (1.626 m)   Wt 77.7 kg (171 lb 6.5 oz)   LMP 11/26/2024 (Approximate)   BMI 29.42 kg/m²     GEN:  NAD  HEENT: NC/AT, nasal piercing (LEFT nostril),      EXTREM:   no edema present.    NEURO:  Mental Status:  Awake, alert and appropriately oriented to time, place, and person.  Normal attention and concentration.  Speech is fluent and appropriate language function (I.e., comprehension).      Cranial Nerves:     Extraocular movements are intact and without nystagmus.   Facial movement is symmetric.  .  Hearing is normal. Uvula in midline. Shoulder shrug symmetrical. Tongue in midline without fasiculation.         "        Motor: antigravity in all limbs  No drift  No resting tremor      DTR: 2+ bilat patellar     Gait and Stance: Normal manner of stance and gait function testing.                 This document has been electronically signed by Mr. Dwight Loyola MPA, PA-C on 12/30/2024, I have personally typed this message using the EMR.       Dr Lorenzo MD  was available during today's visit.          CC: Sonja Hewitt, DO

## 2025-02-28 ENCOUNTER — PATIENT MESSAGE (OUTPATIENT)
Dept: NEUROLOGY | Facility: CLINIC | Age: 50
End: 2025-02-28
Payer: COMMERCIAL

## 2025-03-21 RX ORDER — HYDROXYZINE PAMOATE 50 MG/1
CAPSULE ORAL
Qty: 15 CAPSULE | Refills: 0 | Status: SHIPPED | OUTPATIENT
Start: 2025-03-21

## 2025-03-21 RX ORDER — BACLOFEN 10 MG/1
TABLET ORAL
Qty: 30 TABLET | Refills: 0 | Status: SHIPPED | OUTPATIENT
Start: 2025-03-21

## 2025-04-01 ENCOUNTER — PATIENT MESSAGE (OUTPATIENT)
Dept: ADMINISTRATIVE | Facility: HOSPITAL | Age: 50
End: 2025-04-01
Payer: COMMERCIAL

## 2025-04-02 DIAGNOSIS — Z12.11 SCREENING FOR COLON CANCER: ICD-10-CM

## 2025-04-10 ENCOUNTER — PATIENT MESSAGE (OUTPATIENT)
Dept: FAMILY MEDICINE | Facility: CLINIC | Age: 50
End: 2025-04-10
Payer: COMMERCIAL

## 2025-04-10 DIAGNOSIS — Z11.1 SCREENING FOR TUBERCULOSIS: Primary | ICD-10-CM

## 2025-04-21 ENCOUNTER — RESULTS FOLLOW-UP (OUTPATIENT)
Dept: FAMILY MEDICINE | Facility: CLINIC | Age: 50
End: 2025-04-21
Payer: COMMERCIAL

## 2025-04-29 ENCOUNTER — OFFICE VISIT (OUTPATIENT)
Dept: NEUROLOGY | Facility: CLINIC | Age: 50
End: 2025-04-29
Payer: COMMERCIAL

## 2025-04-29 VITALS
HEIGHT: 64 IN | HEART RATE: 114 BPM | DIASTOLIC BLOOD PRESSURE: 69 MMHG | WEIGHT: 163.56 LBS | RESPIRATION RATE: 17 BRPM | BODY MASS INDEX: 27.92 KG/M2 | SYSTOLIC BLOOD PRESSURE: 98 MMHG

## 2025-04-29 DIAGNOSIS — G43.009 MIGRAINE WITHOUT AURA AND WITHOUT STATUS MIGRAINOSUS, NOT INTRACTABLE: Primary | ICD-10-CM

## 2025-04-29 DIAGNOSIS — R51.9 FACIAL PAIN: ICD-10-CM

## 2025-04-29 DIAGNOSIS — M26.623 BILATERAL TEMPOROMANDIBULAR JOINT PAIN: ICD-10-CM

## 2025-04-29 PROCEDURE — 99999 PR PBB SHADOW E&M-EST. PATIENT-LVL IV: CPT | Mod: PBBFAC,,, | Performed by: PHYSICIAN ASSISTANT

## 2025-04-29 RX ORDER — RIZATRIPTAN BENZOATE 5 MG/1
TABLET, ORALLY DISINTEGRATING ORAL
Qty: 10 TABLET | Refills: 6 | Status: SHIPPED | OUTPATIENT
Start: 2025-04-29

## 2025-04-29 RX ORDER — BACLOFEN 10 MG/1
TABLET ORAL
Qty: 30 TABLET | Refills: 0 | Status: SHIPPED | OUTPATIENT
Start: 2025-04-29

## 2025-04-29 RX ORDER — HYDROXYZINE PAMOATE 50 MG/1
CAPSULE ORAL
Qty: 15 CAPSULE | Refills: 0 | Status: SHIPPED | OUTPATIENT
Start: 2025-04-29

## 2025-04-29 RX ORDER — TIZANIDINE 2 MG/1
TABLET ORAL
Qty: 90 TABLET | Refills: 1 | Status: SHIPPED | OUTPATIENT
Start: 2025-04-29

## 2025-04-29 NOTE — PROGRESS NOTES
[facility]  [address] [facility] [address]  [place] [address]  Phone:[phone]  Fax: [phone]   Follow up visit       Patient Name: Rita Grant  : 1975  MRN:  [id number]  Today: 2025   [id number]  chief complaint: Headache      PCP: Sonja Hewitt DO.       Assessment:   Rita Grant is a 50 y.o. right handed female with a PMHx of: gastric bypass, raynauds, TMJ, HA, and HLD   whom presents with her daughter in follow up for HA. Appears now to have more chronic HA/migraines., but better on current regimen. Gets some facial pain once a while and TMJ pain---feels it is d/t her braces.          Review:    ICD-10-CM ICD-9-CM   1. Migraine without aura and without status migrainosus, not intractable  G43.009 346.10   2. Facial pain  R51.9 784.0   3. Bilateral temporomandibular joint pain  M26.623 524.62                       Plan:               -if HA change in quality/nature, will get updated imaging study          For HA Prevention:     Has zanaflex (PRN Neck pain/facial pain)  Has baclofen PRN facial pain         -refilled both, to take take at same time  Continue emgality 1 shot Q28 days               For GIL :  Maxalt at 5 mg (did not tolerate 10 mg) refilled     To break up Headaches:  Vistaril refilled              All test results as well as any necessary instructions were reviewed and discussed with patient.    Review:  Orders Placed This Encounter    tiZANidine (ZANAFLEX) 2 MG tablet    hydrOXYzine pamoate (VISTARIL) 50 MG Cap    baclofen (LIORESAL) 10 MG tablet    rizatriptan (MAXALT-MLT) 5 MG disintegrating tablet                         Patient to return to PCP/other specialists for all other problems  Patient to continue on all medications as Rx'd   Full office note available for her reference online in secured patient portal   RTO 6 months she will self schedule   The patient indicates understanding of these issues and agrees to the  "plan.    HPI:   Rita Grant is a 50 y.o.right handed, female with a PMHx of: gastric bypass, raynauds, TMJ, anxiety, HA, and HLD   whom presents with her daughter in follow up for HA.     At LOV (12/30/2024): zanaflex, emgality, baclofen, maxalt at 5 mg and vistaril.   Injection site discomfort after emgality, band aid causes a "Red spot"---goes away quickly   No migraines when she receives emgality on schedule      -Had an issue with the pharmacy, delayed shot by 10 days, had more HA around that time   +photophobia  Hasn't had to use maxalt  When she gets home uses vistaril, makes her tired  Last HA: a month ago   Uses zanaflex for her TMJ pain   Uses baclofen for facial pain         -feels all d/t braces                   -doesn't take zanaflex same day as baclofen   Doesn't want to make any medication changes      From previous visit: (8/28/2024): pamelor 10 mg, PRN Zanaflex, maxalt and vistaril.   Stress and fluroescent lights can trigger GIL   Wearing contracts with bad lights can trigger HA   Past 2 months having more HA   12 + HD a month   Using OTC more as maxalt makes her tired, can't take until much later  Frontalis is location of pain   Last HA: Friday of last week   Sleeping better with pamelor, however, taking higher dose (20 mg) also caused problems in fingers (same as elavil)      -she would like to try something new, like the cGRP class      -when asked does have some periodic constipation   Vistaril break ups BAD HA, no side effects, does make her tired       From previous visit: pamelor, maxalt, vistaril and zanaflex   More HA since going back to work in June 2024, July was "a  bad month" but this month has been doing well   Having to take maxalt more often past few months   Tried going up on pamelor at 20 mg and felt it caused her hands to hurt   3-4 HD in past month   Hasn't take to take her abortive in past month   Hasn't had to use zanaflex or vistaril  Been under more stress  States " "she had shingles in past (high left forehead, didn't involve eye)  Has noticed in past few years, some pain starting in her left tip of nose/nostril and can run down into her upper teeth  Has seen dentist  No rash that she has observed  Rarely using zanaflex   Touch makes it worse  Like an "electrical shock" when it occurs, lasting a few seconds, multiple times a day, which can go on for a week or two then stop spontaneously   Currently experiencing in office  States her sleep and stress has been elevated lately, no pyrexia     From previous visit: pamelor, maxalt, vistaril and zanaflex      Had to use maxalt a few times since last visit (need for dental implant in left jaw coming up, potential trigger for HA)  5 HD a month -hormonal related/stress  No side effects from pamelor/maxalt/vistaril/zanaflex and feels this current regimen is working  Last HA was a week ago   +photophobia most days  Historically pain along frontalis   No HA at present    At last visit: has pamelor, maxalt and vistaril.   Menstural cycle and recent dental surgery ("Broke jaw/tooth from grinding, had it fixed a week ago, but then developed dry socket. Was given tramadol now I am on norco and amoxicillin. Been off the pamelor for past few days").   Mouth pain triggering some HA (also d/t lack of sleep)  No side effects from pamelor historically   Maxalt generally helps, no side effects  Vistatril does help with bad HA   Pain along frontalis when she has a HA   Last HA was yesterday        From previous visit: stopped elavil and started pamelor. Maxalt and vistaril available.   Liking pamelor, no side effects like the elavil   1st bad HA yesterday, possibly d/t sun exposure, maxalt was abortive  Pain along frontalis   Did use vistaril last night, because had some trouble sleeping, no side effects (except for some drowsiness this morning)  Doesn't want to make any medication changes         At last visit: continued elavil, had maxalt and " "vistaril. Sent message in May 2023 that PCP stopped her elavil d/t hand pain and hair loss (?).   Frontalis (dull)  Daily HA (30 HD/30)  Last >4, up to full day   Triggers bright light/sung light   Felt she was having worsening pain from her raynauds and hair loss (?) from 10 mg of elavil (?)        -feels both have improved, but also notices it is warmer now regarding raynauds  Was on elavil at 10 mg for a few months before noticing the above issues  Felt when was on elavil her HA were absent  Mild HA no, no other concerns   Hasn't had to use maxalt or vistaril       At last visit: started elavil, maxalt written along with a course of vistaril to break up her HA. PT was also ordered.   HA triggered by stress and certain lighting  Elavil helping without side effects  At most 3-4 HD a month, and only 1 migraine since last being seen   Has taken a total of 3 maxalts since it was Rx'd and all worked well without side effects  PT helping   Never used the vistaril  Noticed a slight tremor yesterday in RUE ("I was doing some heavy duty cleaning")  Felt it got better  No other new concerns     HA Historically:   HA HPI:  Start:HA since teenager, "I would like to go over options to treat this HA"  History of trauma (no), History of CNS infection (no), History of Stroke (no)  Location:frontalis and behind the eyes, sometimes stabbing pain in the temples (for years when asked), sometimes will be in occiput  Severity: range: 4-8/10  Duration:4+ hours, longer if she didn't take medicine for bad ones   Frequency:12-15 HD/30 (2 of which migrainous)   How many HA types do you have (?):2 (migraines add nausea and photophobia/phonophobia)   Associated factors (bolded positive) WITH HA (or migraine): Nausea, vomiting, photophobia, phonophobia, tinnitus, scalp pain, vision loss, diplopia, scintillations, eye pain, jaw pain, weakness?    Tried:tylenol   Triggers (in bold): stress, lack of sleep (has been using benadryl for years to " "sleep. "I also have sinus issues, so it helps that too), too much caffeine, too little caffeine, weather change, seasonal change, strong odours, bright lights, sunlight, food, hormonal changes, TMJ   Last HA: yesterday   Positives in bold: Hx of Kidney Stones, asthma, GI bleed, osteoporosis, CAD/MI, CVA/TIA, DM    Imaging on file: no imaging on head   Therapies tried in past: (failures to be marked, if known---why did it fail?)  MgOx  Zofran  Percocet (in her 20s)   Elavil  Maxalt  Vistaril   PT  Pamelor  Tramadol  Baclofen  Emgality  Zanaflex       As aside a few weeks ago was studying and awoke on the table, unclear if she   "Passed out" or fell asleep. States at times some trouble in sleep. "I was getting a HA and when I woke up I was worried I had a stroke because I was confused and had a bad HA. I was also full of drool." Denies Hx of seizure, unclear if she was dehydrated/missing a meal/ over tired, denies incontinence/tongue biting, states unwitnessed, and no stroke symptoms either (e.g., facial droop, slurred speech, numbness/weakness on one side, etc).     Medication Reconciliation:   Current Outpatient Medications   Medication Sig Dispense Refill    atorvastatin (LIPITOR) 10 MG tablet Take 1 tablet (10 mg total) by mouth once daily. 90 tablet 3    baclofen (LIORESAL) 10 MG tablet TAKE 1 TABLET BY MOUTH THREE TIMES A DAY AS NEEDED FOR FACIAL PAIN 30 tablet 0    biotin 10 mg Tab once daily.       ergocalciferol, vitamin D2, (VITAMIN D ORAL) once daily.       galcanezumab-gnlm 120 mg/mL PnIj Inject 1 mL (120 mg total) into the skin every 28 days. maintenance dose 1 mL 11    hydrOXYzine pamoate (VISTARIL) 50 MG Cap TAKE 1 CAPSULE BY MOUTH 3 TIMES A DAY X 2 DAYS ANY LEFT OVER EVERY 8 HOURS AS NEEDED FOR BAD HEADACHE NO DRIVING CAUSES SEDATION 15 capsule 0    magnesium 250 mg Tab once daily.       melatonin 10 mg Cap every evening.       multivitamin capsule Take 1 capsule by mouth once daily.      rizatriptan " (MAXALT-MLT) 5 MG disintegrating tablet 1 melt at onset headache, second melt 2 hours later if needed, no more than 2 uses day/3 days use in week 10 tablet 6    tiZANidine (ZANAFLEX) 2 MG tablet TAKE ONE TABLET BY MOUTH 1 TO 2 HOURS BEFORE BED AS NEEDED FOR TMJ/HEADACHES 90 tablet 1    valACYclovir (VALTREX) 1000 MG tablet Take 1,000 mg by mouth 2 (two) times daily as needed.       No current facility-administered medications for this visit.     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     Headache      Past Surgical History:   Procedure Laterality Date    BREAST BIOPSY Right     age 23 benign     SECTION      x 3    GASTRIC BYPASS      Rodriguez n Y       Fhx:  Family History   Problem Relation Name Age of Onset    Thyroid nodules Mother      Hypothyroidism Mother      Hypertension Father      Heart disease Father      Ovarian cancer Maternal Grandmother  70 - 79    Heart disease Maternal Grandfather      Colon cancer Paternal Grandmother          colon    Lung cancer Paternal Grandfather          lung    Macular degeneration Neg Hx      Glaucoma Neg Hx      Diabetes Neg Hx      Breast cancer Neg Hx         Shx:   Social History     Socioeconomic History    Marital status:    Occupational History    Occupation: nurse    Tobacco Use    Smoking status: Never    Smokeless tobacco: Never   Substance and Sexual Activity    Alcohol use: Yes     Comment: 2x year    Drug use: No    Sexual activity: Yes     Partners: Male     Birth control/protection: None, Partner-Vasectomy     Social Drivers of Health     Financial Resource Strain: Low Risk  (2025)    Overall Financial Resource Strain (CARDIA)     Difficulty of Paying Living Expenses: Not hard at all   Food Insecurity: No Food Insecurity (2025)    Hunger Vital Sign     Worried  "About Running Out of Food in the Last Year: Never true     Ran Out of Food in the Last Year: Never true   Transportation Needs: No Transportation Needs (4/28/2025)    PRAPARE - Transportation     Lack of Transportation (Medical): No     Lack of Transportation (Non-Medical): No   Physical Activity: Unknown (4/28/2025)    Exercise Vital Sign     Days of Exercise per Week: 3 days   Stress: Stress Concern Present (4/28/2025)    [employer] - Occupational Stress Questionnaire     Feeling of Stress : To some extent   Housing Stability: Low Risk  (4/28/2025)    Housing Stability Vital Sign     Unable to Pay for Housing in the Last Year: No     Number of Times Moved in the Last Year: 0     Homeless in the Last Year: No           Labs:   Reviewed in chart     Imaging:   Reviewed in chart       Other testing:  Reviewed in chart     Note: I have independently reviewed any/all imaging/labs/tests and agree with the report (s) as documented.  Any discrepancies will be as noted/demarcated by free text.  STEVIE TAM 4/29/2025                     ROS:   Review Of Systems (questions asked, positive or additions in BOLD)  Gen: Weight change, fatigue/malaise, pyrexia   HEENT: Tinnitus, headache,  blurred vision, eye pain, diplopia, photophobia,  nose bleeds, congestion, sore throat, jaw pain, scalp pain, neck stiffness   Card: Palpitations, CP   Pulm: SOB, cough   Vas: Easy bruising, easy bleeding   GI: N/V/D/C, incontinence, hematemesis, hematochezia    : incontinence, hematuria   Endocrine: Temp intolerance, polyuria, polydipsia   M/S: Neck pain, myalgia, back pain, joint pain, falls    Neuro: PER HPI   PSY: Memory loss, confusion, depression, anxiety, trouble in sleep          EXAM:   BP 98/69   Pulse (!) 114   Resp 17   Ht 5' 4" (1.626 m)   Wt 74.2 kg (163 lb 9.3 oz)   BMI 28.08 kg/m²     GEN:  NAD  HEENT: NC/AT, nasal piercing (LEFT nostril), braces on teeth      EXTREM:   no edema present.  "   NEURO:  Mental Status:  Awake, alert and appropriately oriented to time, place, and person.  Normal attention and concentration.  Speech is fluent and appropriate language function (I.e., comprehension).      Cranial Nerves:     Extraocular movements are intact and without nystagmus.   Facial movement is symmetric.  .  Hearing is normal. Uvula in midline. Shoulder shrug symmetrical. Tongue in midline without fasiculation.                Motor: antigravity in all limbs  No drift  No resting tremor      DTR: 2+ bilat patellar     Gait and Stance: Normal manner of stance and gait function testing.                 This document has been electronically signed by Mr. Dwight Loyola MPA, PA-C on 4/29/2025, I have personally typed this message using the EMR.       Dr Lorenzo MD  was available during today's visit.          CC: Sonja Hewitt,

## 2025-07-15 RX ORDER — BACLOFEN 10 MG/1
TABLET ORAL
Qty: 30 TABLET | Refills: 0 | Status: SHIPPED | OUTPATIENT
Start: 2025-07-15